# Patient Record
Sex: FEMALE | Race: WHITE | NOT HISPANIC OR LATINO | Employment: OTHER | URBAN - NONMETROPOLITAN AREA
[De-identification: names, ages, dates, MRNs, and addresses within clinical notes are randomized per-mention and may not be internally consistent; named-entity substitution may affect disease eponyms.]

---

## 2017-02-28 ENCOUNTER — PROCEDURE VISIT (OUTPATIENT)
Dept: OBSTETRICS AND GYNECOLOGY | Facility: CLINIC | Age: 72
End: 2017-02-28

## 2017-02-28 VITALS
HEIGHT: 69 IN | DIASTOLIC BLOOD PRESSURE: 65 MMHG | BODY MASS INDEX: 24.29 KG/M2 | SYSTOLIC BLOOD PRESSURE: 121 MMHG | HEART RATE: 83 BPM | WEIGHT: 164 LBS

## 2017-02-28 DIAGNOSIS — Z12.31 SCREENING MAMMOGRAM, ENCOUNTER FOR: ICD-10-CM

## 2017-02-28 DIAGNOSIS — Z12.39 ENCOUNTER FOR SCREENING BREAST EXAMINATION: Primary | ICD-10-CM

## 2017-02-28 DIAGNOSIS — Z90.710 H/O HYSTERECTOMY FOR BENIGN DISEASE: ICD-10-CM

## 2017-02-28 DIAGNOSIS — N95.1 MENOPAUSAL STATE: ICD-10-CM

## 2017-02-28 PROCEDURE — 99213 OFFICE O/P EST LOW 20 MIN: CPT | Performed by: NURSE PRACTITIONER

## 2017-02-28 NOTE — PROGRESS NOTES
"Subjective   Chief Complaint   Patient presents with   • Gynecologic Exam     annual exam     Mary Tan is a 71 y.o. year old  presenting to be seen for annual breast exam and mammogram.    · Last mammogram: was done on approximately 2015 and the result was: Birads IV (Suspicous abnormality).  Had biopsy for right breast lump with marker placement; bx was benign. Had normal diagnostic mammogram of right breast in 2016.  · Last breast MRI: she has never had a MRI    Last DEXA- 10/20/14 Normal  Last colonoscopy- 2015 Normal    No Additional Complaints Reported     She stopped taking the HRT b/c her daughter was diagnosed with estrogen dependent breast cancer last year.    The following portions of the patient's history were reviewed and updated as appropriate:problem list, current medications, allergies, past family history, past medical history, past social history and past surgical history    Review of Systems   Constitutional: Negative for activity change, appetite change, fatigue and unexpected weight change.   Respiratory: Negative for apnea, chest tightness and shortness of breath.    Cardiovascular: Negative for chest pain, palpitations and leg swelling.   Gastrointestinal: Negative for abdominal distention, abdominal pain, anal bleeding and blood in stool.   Genitourinary: Negative for difficulty urinating, dysuria, enuresis, genital sores, pelvic pain, vaginal bleeding, vaginal discharge and vaginal pain.   Skin: Negative for color change and rash.   Neurological: Negative for dizziness, weakness, light-headedness and headaches.   Hematological: Negative for adenopathy.   Psychiatric/Behavioral: Negative for confusion, decreased concentration, dysphoric mood and sleep disturbance. The patient is not nervous/anxious.          Objective   Visit Vitals   • /65   • Pulse 83   • Ht 69\" (175.3 cm)   • Wt 164 lb (74.4 kg)   • LMP  (Approximate)  Comment: 1983   • Breastfeeding " No   • BMI 24.22 kg/m2       General:  well developed; well nourished  no acute distress  appears stated age   Breasts:  Examined in supine position  Symmetric without masses or skin dimpling  Nipples normal without inversion, lesions or discharge  There are no palpable axillary nodes  Fibrocystic changes are present both breasts without a discrete mass     Lab Review   No data reviewed    Imaging   DEXA  Mammogram report       Assessment   1. Encounter for screening breast exam  2. Encounter for screening mammogram  3. History of hysterectomy for benign reasons  4. Menopausal state     Plan   1. Mammogram today  2. DEXA needed in 2019    No orders of the defined types were placed in this encounter.         This note was electronically signed.    Roxana Flynn, APRN  February 28, 2017

## 2017-05-30 ENCOUNTER — OFFICE VISIT (OUTPATIENT)
Dept: ORTHOPEDIC SURGERY | Facility: CLINIC | Age: 72
End: 2017-05-30

## 2017-05-30 VITALS — WEIGHT: 158.8 LBS | HEIGHT: 67 IN | BODY MASS INDEX: 24.92 KG/M2

## 2017-05-30 DIAGNOSIS — M25.562 CHRONIC PAIN OF LEFT KNEE: Primary | ICD-10-CM

## 2017-05-30 DIAGNOSIS — M25.462 EFFUSION OF LEFT KNEE: ICD-10-CM

## 2017-05-30 DIAGNOSIS — G89.29 CHRONIC PAIN OF LEFT KNEE: Primary | ICD-10-CM

## 2017-05-30 DIAGNOSIS — M23.92 INTERNAL DERANGEMENT OF LEFT KNEE: ICD-10-CM

## 2017-05-30 PROCEDURE — 20610 DRAIN/INJ JOINT/BURSA W/O US: CPT | Performed by: ORTHOPAEDIC SURGERY

## 2017-05-30 PROCEDURE — 99203 OFFICE O/P NEW LOW 30 MIN: CPT | Performed by: ORTHOPAEDIC SURGERY

## 2017-05-30 RX ORDER — GRAPE SEED EXT/BIOFLAV,CITRUS 50MG-250MG
CAPSULE ORAL
COMMUNITY
End: 2020-10-16

## 2017-05-30 RX ADMIN — LIDOCAINE HYDROCHLORIDE 2 ML: 10 INJECTION, SOLUTION INFILTRATION; PERINEURAL at 09:41

## 2017-05-30 RX ADMIN — TRIAMCINOLONE ACETONIDE 40 MG: 40 INJECTION, SUSPENSION INTRA-ARTICULAR; INTRAMUSCULAR at 09:41

## 2017-05-31 RX ORDER — TRIAMCINOLONE ACETONIDE 40 MG/ML
40 INJECTION, SUSPENSION INTRA-ARTICULAR; INTRAMUSCULAR
Status: COMPLETED | OUTPATIENT
Start: 2017-05-30 | End: 2017-05-30

## 2017-05-31 RX ORDER — LIDOCAINE HYDROCHLORIDE 10 MG/ML
2 INJECTION, SOLUTION INFILTRATION; PERINEURAL
Status: COMPLETED | OUTPATIENT
Start: 2017-05-30 | End: 2017-05-30

## 2017-09-21 ENCOUNTER — TRANSCRIBE ORDERS (OUTPATIENT)
Dept: LAB | Facility: HOSPITAL | Age: 72
End: 2017-09-21

## 2017-09-21 ENCOUNTER — LAB (OUTPATIENT)
Dept: LAB | Facility: HOSPITAL | Age: 72
End: 2017-09-21

## 2017-09-21 DIAGNOSIS — I10 ESSENTIAL HYPERTENSION: Primary | ICD-10-CM

## 2017-09-21 DIAGNOSIS — I10 ESSENTIAL HYPERTENSION: ICD-10-CM

## 2017-09-21 LAB
ALBUMIN SERPL-MCNC: 4.2 G/DL (ref 3.4–4.8)
ALBUMIN/GLOB SERPL: 1.4 G/DL (ref 1.1–1.8)
ALP SERPL-CCNC: 75 U/L (ref 38–126)
ALT SERPL W P-5'-P-CCNC: 34 U/L (ref 9–52)
ANION GAP SERPL CALCULATED.3IONS-SCNC: 8 MMOL/L (ref 5–15)
AST SERPL-CCNC: 35 U/L (ref 14–36)
BASOPHILS # BLD AUTO: 0.05 10*3/MM3 (ref 0–0.2)
BASOPHILS NFR BLD AUTO: 0.9 % (ref 0–2)
BILIRUB SERPL-MCNC: 0.7 MG/DL (ref 0.2–1.3)
BUN BLD-MCNC: 19 MG/DL (ref 7–21)
BUN/CREAT SERPL: 19.2 (ref 7–25)
CALCIUM SPEC-SCNC: 9.3 MG/DL (ref 8.4–10.2)
CHLORIDE SERPL-SCNC: 103 MMOL/L (ref 95–110)
CO2 SERPL-SCNC: 29 MMOL/L (ref 22–31)
CREAT BLD-MCNC: 0.99 MG/DL (ref 0.5–1)
DEPRECATED RDW RBC AUTO: 45.4 FL (ref 36.4–46.3)
EOSINOPHIL # BLD AUTO: 0.14 10*3/MM3 (ref 0–0.7)
EOSINOPHIL NFR BLD AUTO: 2.5 % (ref 0–7)
ERYTHROCYTE [DISTWIDTH] IN BLOOD BY AUTOMATED COUNT: 13.2 % (ref 11.5–14.5)
GFR SERPL CREATININE-BSD FRML MDRD: 55 ML/MIN/1.73 (ref 39–90)
GLOBULIN UR ELPH-MCNC: 3.1 GM/DL (ref 2.3–3.5)
GLUCOSE BLD-MCNC: 90 MG/DL (ref 60–100)
HCT VFR BLD AUTO: 40.2 % (ref 35–45)
HGB BLD-MCNC: 13.2 G/DL (ref 12–15.5)
IMM GRANULOCYTES # BLD: 0 10*3/MM3 (ref 0–0.02)
IMM GRANULOCYTES NFR BLD: 0 % (ref 0–0.5)
LYMPHOCYTES # BLD AUTO: 1.71 10*3/MM3 (ref 0.6–4.2)
LYMPHOCYTES NFR BLD AUTO: 30.1 % (ref 10–50)
MCH RBC QN AUTO: 30.6 PG (ref 26.5–34)
MCHC RBC AUTO-ENTMCNC: 32.8 G/DL (ref 31.4–36)
MCV RBC AUTO: 93.3 FL (ref 80–98)
MONOCYTES # BLD AUTO: 0.53 10*3/MM3 (ref 0–0.9)
MONOCYTES NFR BLD AUTO: 9.3 % (ref 0–12)
NEUTROPHILS # BLD AUTO: 3.25 10*3/MM3 (ref 2–8.6)
NEUTROPHILS NFR BLD AUTO: 57.2 % (ref 37–80)
PLATELET # BLD AUTO: 257 10*3/MM3 (ref 150–450)
PMV BLD AUTO: 11.3 FL (ref 8–12)
POTASSIUM BLD-SCNC: 5.5 MMOL/L (ref 3.5–5.1)
PROT SERPL-MCNC: 7.3 G/DL (ref 6.3–8.6)
RBC # BLD AUTO: 4.31 10*6/MM3 (ref 3.77–5.16)
SODIUM BLD-SCNC: 140 MMOL/L (ref 137–145)
WBC NRBC COR # BLD: 5.68 10*3/MM3 (ref 3.2–9.8)

## 2017-09-21 PROCEDURE — 80053 COMPREHEN METABOLIC PANEL: CPT | Performed by: INTERNAL MEDICINE

## 2017-09-21 PROCEDURE — 36415 COLL VENOUS BLD VENIPUNCTURE: CPT

## 2017-09-21 PROCEDURE — 85025 COMPLETE CBC W/AUTO DIFF WBC: CPT | Performed by: INTERNAL MEDICINE

## 2018-03-15 ENCOUNTER — APPOINTMENT (OUTPATIENT)
Dept: LAB | Facility: HOSPITAL | Age: 73
End: 2018-03-15

## 2018-03-15 ENCOUNTER — TRANSCRIBE ORDERS (OUTPATIENT)
Dept: LAB | Facility: HOSPITAL | Age: 73
End: 2018-03-15

## 2018-03-15 DIAGNOSIS — I10 ESSENTIAL (PRIMARY) HYPERTENSION: Primary | ICD-10-CM

## 2018-03-15 DIAGNOSIS — R53.83 FATIGUE, UNSPECIFIED TYPE: ICD-10-CM

## 2018-03-15 LAB
ALBUMIN SERPL-MCNC: 4.2 G/DL (ref 3.4–4.8)
ALBUMIN/GLOB SERPL: 1.4 G/DL (ref 1.1–1.8)
ALP SERPL-CCNC: 78 U/L (ref 38–126)
ALT SERPL W P-5'-P-CCNC: 30 U/L (ref 9–52)
ANION GAP SERPL CALCULATED.3IONS-SCNC: 13 MMOL/L (ref 5–15)
AST SERPL-CCNC: 33 U/L (ref 14–36)
BASOPHILS # BLD AUTO: 0.04 10*3/MM3 (ref 0–0.2)
BASOPHILS NFR BLD AUTO: 0.9 % (ref 0–2)
BILIRUB SERPL-MCNC: 0.3 MG/DL (ref 0.2–1.3)
BUN BLD-MCNC: 17 MG/DL (ref 7–21)
BUN/CREAT SERPL: 17.2 (ref 7–25)
CALCIUM SPEC-SCNC: 9.3 MG/DL (ref 8.4–10.2)
CHLORIDE SERPL-SCNC: 101 MMOL/L (ref 95–110)
CO2 SERPL-SCNC: 29 MMOL/L (ref 22–31)
CREAT BLD-MCNC: 0.99 MG/DL (ref 0.5–1)
DEPRECATED RDW RBC AUTO: 47.4 FL (ref 36.4–46.3)
EOSINOPHIL # BLD AUTO: 0.11 10*3/MM3 (ref 0–0.7)
EOSINOPHIL NFR BLD AUTO: 2.4 % (ref 0–7)
ERYTHROCYTE [DISTWIDTH] IN BLOOD BY AUTOMATED COUNT: 13.8 % (ref 11.5–14.5)
GFR SERPL CREATININE-BSD FRML MDRD: 55 ML/MIN/1.73 (ref 39–90)
GLOBULIN UR ELPH-MCNC: 3 GM/DL (ref 2.3–3.5)
GLUCOSE BLD-MCNC: 96 MG/DL (ref 60–100)
HCT VFR BLD AUTO: 40.7 % (ref 35–45)
HGB BLD-MCNC: 13.7 G/DL (ref 12–15.5)
IMM GRANULOCYTES # BLD: 0.01 10*3/MM3 (ref 0–0.02)
IMM GRANULOCYTES NFR BLD: 0.2 % (ref 0–0.5)
LYMPHOCYTES # BLD AUTO: 1.67 10*3/MM3 (ref 0.6–4.2)
LYMPHOCYTES NFR BLD AUTO: 35.7 % (ref 10–50)
MCH RBC QN AUTO: 31.4 PG (ref 26.5–34)
MCHC RBC AUTO-ENTMCNC: 33.7 G/DL (ref 31.4–36)
MCV RBC AUTO: 93.1 FL (ref 80–98)
MONOCYTES # BLD AUTO: 0.32 10*3/MM3 (ref 0–0.9)
MONOCYTES NFR BLD AUTO: 6.8 % (ref 0–12)
NEUTROPHILS # BLD AUTO: 2.53 10*3/MM3 (ref 2–8.6)
NEUTROPHILS NFR BLD AUTO: 54 % (ref 37–80)
PLATELET # BLD AUTO: 225 10*3/MM3 (ref 150–450)
PMV BLD AUTO: 11.3 FL (ref 8–12)
POTASSIUM BLD-SCNC: 5 MMOL/L (ref 3.5–5.1)
PROT SERPL-MCNC: 7.2 G/DL (ref 6.3–8.6)
RBC # BLD AUTO: 4.37 10*6/MM3 (ref 3.77–5.16)
SODIUM BLD-SCNC: 143 MMOL/L (ref 137–145)
TSH SERPL DL<=0.05 MIU/L-ACNC: <0.02 MIU/ML (ref 0.46–4.68)
WBC NRBC COR # BLD: 4.68 10*3/MM3 (ref 3.2–9.8)

## 2018-03-15 PROCEDURE — 36415 COLL VENOUS BLD VENIPUNCTURE: CPT | Performed by: INTERNAL MEDICINE

## 2018-03-15 PROCEDURE — 80053 COMPREHEN METABOLIC PANEL: CPT | Performed by: INTERNAL MEDICINE

## 2018-03-15 PROCEDURE — 85025 COMPLETE CBC W/AUTO DIFF WBC: CPT | Performed by: INTERNAL MEDICINE

## 2018-03-15 PROCEDURE — 84443 ASSAY THYROID STIM HORMONE: CPT | Performed by: INTERNAL MEDICINE

## 2018-03-16 ENCOUNTER — PREP FOR SURGERY (OUTPATIENT)
Dept: OTHER | Facility: HOSPITAL | Age: 73
End: 2018-03-16

## 2018-03-16 DIAGNOSIS — R07.9 CHEST PAIN, UNSPECIFIED TYPE: Primary | ICD-10-CM

## 2018-03-16 RX ORDER — SODIUM CHLORIDE 0.9 % (FLUSH) 0.9 %
1-10 SYRINGE (ML) INJECTION AS NEEDED
Status: CANCELLED | OUTPATIENT
Start: 2018-03-19

## 2018-03-19 ENCOUNTER — HOSPITAL ENCOUNTER (OUTPATIENT)
Facility: HOSPITAL | Age: 73
Setting detail: HOSPITAL OUTPATIENT SURGERY
Discharge: HOME | End: 2018-03-19
Attending: INTERNAL MEDICINE | Admitting: INTERNAL MEDICINE

## 2018-03-19 VITALS
DIASTOLIC BLOOD PRESSURE: 60 MMHG | TEMPERATURE: 97.2 F | RESPIRATION RATE: 18 BRPM | SYSTOLIC BLOOD PRESSURE: 132 MMHG | WEIGHT: 164.68 LBS | HEIGHT: 68 IN | BODY MASS INDEX: 24.96 KG/M2 | OXYGEN SATURATION: 99 % | HEART RATE: 66 BPM

## 2018-03-19 DIAGNOSIS — R07.9 CHEST PAIN, UNSPECIFIED TYPE: ICD-10-CM

## 2018-03-19 LAB
ANION GAP SERPL CALCULATED.3IONS-SCNC: 14 MMOL/L (ref 5–15)
APTT PPP: 22.3 SECONDS (ref 20–40.3)
BASOPHILS # BLD AUTO: 0 10*3/MM3 (ref 0–0.2)
BASOPHILS NFR BLD AUTO: 0 % (ref 0–2)
BUN BLD-MCNC: 26 MG/DL (ref 7–21)
BUN/CREAT SERPL: 35.6 (ref 7–25)
CALCIUM SPEC-SCNC: 8.9 MG/DL (ref 8.4–10.2)
CHLORIDE SERPL-SCNC: 104 MMOL/L (ref 95–110)
CO2 SERPL-SCNC: 24 MMOL/L (ref 22–31)
CREAT BLD-MCNC: 0.73 MG/DL (ref 0.5–1)
DEPRECATED RDW RBC AUTO: 47.2 FL (ref 36.4–46.3)
EOSINOPHIL # BLD AUTO: 0.01 10*3/MM3 (ref 0–0.7)
EOSINOPHIL NFR BLD AUTO: 0.1 % (ref 0–7)
ERYTHROCYTE [DISTWIDTH] IN BLOOD BY AUTOMATED COUNT: 14.1 % (ref 11.5–14.5)
GFR SERPL CREATININE-BSD FRML MDRD: 78 ML/MIN/1.73 (ref 39–90)
GLUCOSE BLD-MCNC: 114 MG/DL (ref 60–100)
HCT VFR BLD AUTO: 37.6 % (ref 35–45)
HGB BLD-MCNC: 12.8 G/DL (ref 12–15.5)
IMM GRANULOCYTES # BLD: 0.02 10*3/MM3 (ref 0–0.02)
IMM GRANULOCYTES NFR BLD: 0.3 % (ref 0–0.5)
INR PPP: 0.97 (ref 0.8–1.2)
INR PPP: 1.07 (ref 0.8–1.2)
LYMPHOCYTES # BLD AUTO: 0.73 10*3/MM3 (ref 0.6–4.2)
LYMPHOCYTES NFR BLD AUTO: 10 % (ref 10–50)
MCH RBC QN AUTO: 31.3 PG (ref 26.5–34)
MCHC RBC AUTO-ENTMCNC: 34 G/DL (ref 31.4–36)
MCV RBC AUTO: 91.9 FL (ref 80–98)
MONOCYTES # BLD AUTO: 0.35 10*3/MM3 (ref 0–0.9)
MONOCYTES NFR BLD AUTO: 4.8 % (ref 0–12)
NEUTROPHILS # BLD AUTO: 6.19 10*3/MM3 (ref 2–8.6)
NEUTROPHILS NFR BLD AUTO: 84.8 % (ref 37–80)
PLATELET # BLD AUTO: 201 10*3/MM3 (ref 150–450)
PMV BLD AUTO: 11.5 FL (ref 8–12)
POTASSIUM BLD-SCNC: 4.4 MMOL/L (ref 3.5–5.1)
PROTHROMBIN TIME: 12.7 SECONDS (ref 11.1–15.3)
PROTHROMBIN TIME: 13.7 SECONDS (ref 11.1–15.3)
RBC # BLD AUTO: 4.09 10*6/MM3 (ref 3.77–5.16)
SODIUM BLD-SCNC: 142 MMOL/L (ref 137–145)
T4 FREE SERPL-MCNC: 0.99 NG/DL (ref 0.78–2.19)
WBC NRBC COR # BLD: 7.3 10*3/MM3 (ref 3.2–9.8)

## 2018-03-19 PROCEDURE — 93458 L HRT ARTERY/VENTRICLE ANGIO: CPT | Performed by: INTERNAL MEDICINE

## 2018-03-19 PROCEDURE — C1760 CLOSURE DEV, VASC: HCPCS | Performed by: INTERNAL MEDICINE

## 2018-03-19 PROCEDURE — 80048 BASIC METABOLIC PNL TOTAL CA: CPT | Performed by: INTERNAL MEDICINE

## 2018-03-19 PROCEDURE — 25010000002 HYDROMORPHONE PER 4 MG: Performed by: INTERNAL MEDICINE

## 2018-03-19 PROCEDURE — 85610 PROTHROMBIN TIME: CPT | Performed by: INTERNAL MEDICINE

## 2018-03-19 PROCEDURE — 86376 MICROSOMAL ANTIBODY EACH: CPT | Performed by: INTERNAL MEDICINE

## 2018-03-19 PROCEDURE — 84439 ASSAY OF FREE THYROXINE: CPT | Performed by: INTERNAL MEDICINE

## 2018-03-19 PROCEDURE — 0 IOPAMIDOL PER 1 ML: Performed by: INTERNAL MEDICINE

## 2018-03-19 PROCEDURE — 84445 ASSAY OF TSI GLOBULIN: CPT | Performed by: INTERNAL MEDICINE

## 2018-03-19 PROCEDURE — 99152 MOD SED SAME PHYS/QHP 5/>YRS: CPT | Performed by: INTERNAL MEDICINE

## 2018-03-19 PROCEDURE — 86800 THYROGLOBULIN ANTIBODY: CPT | Performed by: INTERNAL MEDICINE

## 2018-03-19 PROCEDURE — C1769 GUIDE WIRE: HCPCS | Performed by: INTERNAL MEDICINE

## 2018-03-19 PROCEDURE — 25010000002 HYDROCORTISONE SODIUM SUCCINATE 100 MG RECONSTITUTED SOLUTION: Performed by: INTERNAL MEDICINE

## 2018-03-19 PROCEDURE — 84443 ASSAY THYROID STIM HORMONE: CPT | Performed by: INTERNAL MEDICINE

## 2018-03-19 PROCEDURE — 85025 COMPLETE CBC W/AUTO DIFF WBC: CPT | Performed by: INTERNAL MEDICINE

## 2018-03-19 PROCEDURE — 84479 ASSAY OF THYROID (T3 OR T4): CPT | Performed by: INTERNAL MEDICINE

## 2018-03-19 PROCEDURE — 25010000002 MIDAZOLAM PER 1 MG: Performed by: INTERNAL MEDICINE

## 2018-03-19 PROCEDURE — 84480 ASSAY TRIIODOTHYRONINE (T3): CPT | Performed by: INTERNAL MEDICINE

## 2018-03-19 PROCEDURE — C1894 INTRO/SHEATH, NON-LASER: HCPCS | Performed by: INTERNAL MEDICINE

## 2018-03-19 PROCEDURE — 85730 THROMBOPLASTIN TIME PARTIAL: CPT | Performed by: INTERNAL MEDICINE

## 2018-03-19 PROCEDURE — 84481 FREE ASSAY (FT-3): CPT | Performed by: INTERNAL MEDICINE

## 2018-03-19 PROCEDURE — 99153 MOD SED SAME PHYS/QHP EA: CPT | Performed by: INTERNAL MEDICINE

## 2018-03-19 RX ORDER — METOPROLOL SUCCINATE 25 MG
12.5 TABLET, EXTENDED RELEASE 24 HR ORAL NIGHTLY
COMMUNITY
End: 2020-10-16

## 2018-03-19 RX ORDER — SODIUM CHLORIDE 450 MG/100ML
100 INJECTION, SOLUTION INTRAVENOUS CONTINUOUS
Status: DISCONTINUED | OUTPATIENT
Start: 2018-03-19 | End: 2018-03-19 | Stop reason: HOSPADM

## 2018-03-19 RX ORDER — PREDNISONE 20 MG/1
20 TABLET ORAL DAILY
COMMUNITY
End: 2018-04-04

## 2018-03-19 RX ORDER — LIDOCAINE HYDROCHLORIDE 20 MG/ML
INJECTION, SOLUTION INFILTRATION; PERINEURAL AS NEEDED
Status: DISCONTINUED | OUTPATIENT
Start: 2018-03-19 | End: 2018-03-19 | Stop reason: HOSPADM

## 2018-03-19 RX ORDER — SODIUM CHLORIDE 9 MG/ML
50 INJECTION, SOLUTION INTRAVENOUS CONTINUOUS
Status: DISCONTINUED | OUTPATIENT
Start: 2018-03-19 | End: 2018-03-19 | Stop reason: HOSPADM

## 2018-03-19 RX ORDER — SODIUM CHLORIDE 0.9 % (FLUSH) 0.9 %
1-10 SYRINGE (ML) INJECTION AS NEEDED
Status: DISCONTINUED | OUTPATIENT
Start: 2018-03-19 | End: 2018-03-19 | Stop reason: HOSPADM

## 2018-03-19 RX ORDER — NITROGLYCERIN 0.4 MG/1
TABLET SUBLINGUAL AS NEEDED
Status: DISCONTINUED | OUTPATIENT
Start: 2018-03-19 | End: 2018-03-19 | Stop reason: HOSPADM

## 2018-03-19 RX ORDER — DIPHENHYDRAMINE HCL 25 MG
25 CAPSULE ORAL EVERY 6 HOURS PRN
COMMUNITY
End: 2018-04-04

## 2018-03-19 RX ORDER — MIDAZOLAM HYDROCHLORIDE 1 MG/ML
INJECTION INTRAMUSCULAR; INTRAVENOUS AS NEEDED
Status: DISCONTINUED | OUTPATIENT
Start: 2018-03-19 | End: 2018-03-19 | Stop reason: HOSPADM

## 2018-03-19 RX ORDER — BIOTIN 10 MG
2 TABLET ORAL DAILY
COMMUNITY

## 2018-03-19 RX ADMIN — HYDROCORTISONE SODIUM SUCCINATE 100 MG: 100 INJECTION, POWDER, FOR SOLUTION INTRAMUSCULAR; INTRAVENOUS at 08:46

## 2018-03-19 RX ADMIN — SODIUM CHLORIDE 50 ML/HR: 9 INJECTION, SOLUTION INTRAVENOUS at 07:04

## 2018-03-19 NOTE — H&P
Cardiology History and Physical Note        Patient Name: Mary Tan  Age/Sex: 72 y.o. female  : 1945  MRN: [id number]    Date of Admission : 3/19/2018    Primary care Physician: No Known Provider    Reason for Admission:  Chest pain frequent premature ventricular complex unstable angina pectoris left heart catheterization      Subjective:       Chief Complaint: Chest pain palpitation.        History of Present Illness:  Mary Tan is a 72 y.o. female     Body mass index is 25.04 kg/m². with a past medical history significant for chest pain with a negative adenosine Cardiolite stress test for any evidence of stress-induced ischemia, negative head up tilt table testing, with frequent premature ventricular complex, with history of osteoporosis, preserved left ventricular systolic function with mild to moderate tricuspid regurgitation with a right ventricular systolic pressure of 34, mild mitral regurgitation and history of bladder carcinoma, status post surgical intervention and previous history of tobacco abuse.   Patient presented for further evaluation for symptoms of chest pain.  Patient has had on and off symptoms of chest discomfort which is nonpleuritic in nature and nonradiating without any associated shortness of breath.  Patient has had frequent premature ventricular complex with episodes of trigeminy.  Patient has been on magnesium supplement along with potassium supplement.    Patient previous transthoracic echocardiogram had revealed preserved left ventricular systolic function.    On further questioning patient had indicated off a sister who has frequent premature ventricular complex in [place] and had undergone radiofrequency ablation.  Patient has had previous history of lightheaded dizziness but currently is not having any symptoms of dizziness or syncopal episode to suggest sustained ventricular tachycardia.    Patient on further questioning denies any symptoms of  paroxysmal nocturnal dyspnea or orthopnea.  Patient 10 point review of system except for what is stated in the history of present illness is negative.        Past Medical History:  1. Light-headed, dizziness, presyncope with sinus bradycardia with negative head up tilt table testing.   2. Negative adenosine Cardiolite stress test done in 2010 for any evidence of stress-induced ischemia   3. Concentric left ventricular hypertrophy with an ejection fraction of 55%   4. Mild to moderate tricuspid regurgitation with a right ventricular systolic pressure of 34 5. Mild mitral regurgitation   6. Osteoporosis   7. History of bladder carcinoma   8. Previous history of tobacco abuse.  9.  Fibrocystic breast disease.     PSH:   1. Hysterectomy   2. Tonsillectomy   3. Cataract surgery   4. Left eye lens implant   5. Coloscopy   6. Bladder surgery.  7.  Breast biopsy.  8.  Pap smear.      SH: Quit smoking in 2001. Social alcohol intake. Patient is active, currently retired.       FH: Father had atrial fibrillation.     CARDIAC RISK FACTORS:   1. Postmenopausal   2. Previous history of tobacco abuse             Allergies:  Allergies   Allergen Reactions   • Contrast Dye Anaphylaxis     IODINATED CONTRAST MEDIA - IV DYE   • Macrobid [Nitrofurantoin Monohyd Macro] Shortness Of Breath   • Macrodantin [Nitrofurantoin Macrocrystal] Shortness Of Breath   • Nitrofuran Derivatives Shortness Of Breath   • Adhesive Tape Rash   • Biaxin [Clarithromycin] Rash   • Ceclor [Cefaclor] Rash   • Ceftin [Cefuroxime Axetil] Rash   • Codeine Rash   • E.E.S. [Erythromycin] Rash   • Other Rash     Cipro   • Penicillins Rash   • Septra [Sulfamethoxazole-Trimethoprim] Rash   • Sulfa Antibiotics Rash       Home Medication::  Prior to Admission medications    Medication Sig Start Date End Date Taking? Authorizing Provider   ASPIRIN PO Every day Oral 8/18/16  Yes Historical Provider, MD   Biotin 10 MG tablet Take 2 capsules by mouth Daily.   Yes Historical  Provider, MD   Black Cohosh 540 MG capsule Take  by mouth.   Yes Historical Provider, MD   diphenhydrAMINE (BENADRYL) 25 mg capsule Take 25 mg by mouth Every 6 (Six) Hours As Needed for Itching. 2 caps   Yes Historical Provider, MD   Magnesium Oxide (MAG-OXIDE PO) Take  by mouth. 2 times per day Oral 8/18/16  Yes Historical Provider, MD   metoprolol succinate XL (TOPROL-XL) 12.5 MG 24 hr half tablet Take 12.5 mg by mouth Every Night.   Yes Historical Provider, MD   predniSONE (DELTASONE) 20 MG tablet Take 20 mg by mouth Daily. 3 tabs in am 3 tabs at night   Yes Historical Provider, MD           Review of Systems:       Constitutional:  Denies recent weight loss, weight gain, fever or chills, no change in exercise tolerance     HENT:  Denies any hearing loss, epistaxis, hoarseness, or difficulty speaking.     Eyes: Wears eyeglasses or contact lenses     Respiratory:  Denies dyspnea with exertion,no cough, wheezing, or hemoptysis.     Cardiovascular: Positive for chest pain and  Palpitations,. Negative for orthopnea, PND, peripheral edema, syncope, or claudication.     Gastrointestinal:  Denies change in bowel habits, dyspepsia, ulcer disease, hematochezia, or melena.  No nausea, no vomiting, no hematemesis, no diarrhea or constipation, no melena      Endocrine: Negative for cold intolerance, heat intolerance, polydipsia, polyphagia and polyuria. Denies any history of weight change, heat/cold intolerance, polydipsia, polyuria     Genitourinary: Negative for hematuria.      Musculoskeletal: Denies any history of arthritic symptoms or back problems .  No joint pain, joint stiffness, joint swelling, muscle pain, muscle weakness and neck pain    Skin:  Denies any change in hair or nails, rashes, or skin lesions.     Allergic/Immunologic: Negative.  Negative for environmental allergies, food allergies and immunocompromised state.     Neurological:  Denies any history of recurrent headaches, strokes, TIA, or seizure  disorder.     Hematological: Denies excessive bleeding, easy bruising, fatigue, lymphadenopathy and petechiae or any bleeding disorders, or lymphadenopathy.     Psychiatric/Behavioral: Denies any history of depression, substance abuse, or change in cognitive function. Denies any psychomotor reaction or tangential thought.  No depression, homicidal ideations and suicidal ideations    Endocrine: No frequent urination and nocturia, temperature intolerance, weight gain, unintended and weight loss, unintended            Objective:     Objective:  Temp:  [97 °F (36.1 °C)] 97 °F (36.1 °C)  Heart Rate:  [69] 69  Resp:  [18] 18  BP: (155)/(77) 155/77      Body mass index is 25.04 kg/m².           Physical Exam:   General Appearance:    Alert, oriented, cooperative, in no acute distress   Head:    Normocephalic, atraumatic, without obvious abnormality   Eyes:           JAYLENE  Lids and lashes normal, conjunctivae and sclerae normal, no icterus, no pallor   Ears:    Ears appear intact with no abnormalities noted   Throat:   Mucous membranes pink and moist   Neck:   Supple, trachea midline, no carotid bruit, no organomegaly or JVD   Lungs:     Clear to auscultation and percussion, respirations regular, even and Unlabored. No wheezes, rales, rhonchi    Heart:    Regular rhythm and normal rate, normal S1 and S2, no            murmur, no gallop, no rub, no click   Abdomen:     Soft, non-tender, non-distended, no guarding, no rebound tenderness, Normal bowel sounds in all four quadrant, no masses, liver and spleen nonpalpable,    Genitalia:    Deferred   Extremities:   Moves all extremities well, no edema, no cyanosis, no              Redness, no clubbing   Pulses:   Pulses palpable and equal bilaterally   Skin:   Moist and warm. No bleeding, bruising or rash   Neurologic/Psychiatric:   Alert and oriented to person, place, and time.  Motor, power and tone in upper and lower extremity is grossly intact.  No focal neurological  deficits. Normal cognitive function. No psychomotor reaction or tangential thought. No depression, homicidal ideations and suicidal ideations           Lab Review:       Results from last 7 days  Lab Units 03/19/18  0709 03/15/18  1121   SODIUM mmol/L 142 143   POTASSIUM mmol/L 4.4 5.0   CHLORIDE mmol/L 104 101   CO2 mmol/L 24.0 29.0   BUN mg/dL 26* 17   CREATININE mg/dL 0.73 0.99   CALCIUM mg/dL 8.9 9.3   BILIRUBIN mg/dL  --  0.3   ALK PHOS U/L  --  78   ALT (SGPT) U/L  --  30   AST (SGOT) U/L  --  33   GLUCOSE mg/dL 114* 96               Results from last 7 days  Lab Units 03/19/18  0709   WBC 10*3/mm3 7.30   HEMOGLOBIN g/dL 12.8   HEMATOCRIT % 37.6   PLATELETS 10*3/mm3 201       Results from last 7 days  Lab Units 03/19/18  0709   INR  0.97   APTT seconds 22.3               Results from last 7 days  Lab Units 03/15/18  1121   TSH mIU/mL <0.020*           EKG:   ECG/EMG Results (last 24 hours)     ** No results found for the last 24 hours. **          Imaging:  Imaging Results (last 24 hours)     ** No results found for the last 24 hours. **          I personally viewed and interpreted the patient's EKG/Telemetry data.    Assessment:   1.  Chest pain.  2.  Frequent premature ventricular complex.  3.  Previous stress tests which was negative.  4.  Negative head up tilt table testing  5.  Hyperthyroidism possible with a low TSH.  6.  Mild to moderate tricuspid regurgitation          Plan:   1.  Chest pain suggestive of crescendo unstable angina pectoris.  Patient has frequent premature ventricular complex.  Patient at the present time has been recommended to undergo a transthoracic echocardiogram to evaluate the left ventricular systolic function and regional segmental wall motion abnormality.  Patient has also been recommended to undergo coronary angiogram due to the patient ongoing symptoms of chest pain with increasing frequency and severity along with premature ventricular complex.  Risk-benefit treatment option  for the coronary angiogram were discussed with the patient and an informed consent was obtained from the patient for the coronary angiogram.  Patient Mallampati score #2 patient ASA classification was #2.  Risk for minimal sedation was also discussed with the patient.  2.  Frequent premature ventricular complex.  Patient would be supplemented with magnesium which she is already taking along with a low dose of a beta blocker Inderal 5 mg twice a day and her blood pressure is not elevated.  Patient does not have history of hypertension and blood pressure is on the low side at home 104/50 and would not be able to tolerate metoprolol.  Patient has been recommended to keep a close follow-up and the blood pressure.  3.  Mild-to-moderate tricuspid regurgitation mild mitral regurgitation.  Clinically at the present time patient is not in congestive heart failure.  4.  Frequent premature ventricular complex.  Patient would undergo complete thyroid profile check including TSH to rule out hyperthyroidism.  5.  Fibrocystic breast disease is noted.  6.  History of bladder carcinoma status post surgical intervention is noted.  7.  Previous history of tobacco abuse is noted.      Time: time spent in face-to-face evaluation of greater than 55 minutes and interacting and formulating examining and discussing the plan with the patient with 50% of greater time spent in face-to-face interaction.    Obed Leonard MD  03/19/18  8:32 AM      Dictated utilizing Dragon dictation.

## 2018-03-20 LAB
FT4I SERPL CALC-MCNC: 1.6 (ref 1.2–4.9)
FT4I SERPL CALC-MCNC: 1.6 (ref 1.2–4.9)
INTERPRETATION: NORMAL
T3 SERPL-MCNC: 71 NG/DL (ref 71–180)
T3FREE SERPL-MCNC: 1.9 PG/ML (ref 2–4.4)
T3FREE SERPL-MCNC: 2 PG/ML (ref 2–4.4)
T3RU NFR SERPL: 26 % (ref 24–39)
T3RU NFR SERPL: 27 % (ref 24–39)
T4 FREE SERPL-MCNC: 1.14 NG/DL (ref 0.82–1.77)
T4 SERPL-MCNC: 5.9 UG/DL (ref 4.5–12)
T4 SERPL-MCNC: 6 UG/DL (ref 4.5–12)
THYROGLOB AB SERPL-ACNC: <1 IU/ML (ref 0–0.9)
THYROPEROXIDASE AB SERPL-ACNC: 27 IU/ML (ref 0–34)
TSH SERPL-ACNC: 0.13 UIU/ML (ref 0.45–4.5)
TSH SERPL-ACNC: 0.13 UIU/ML (ref 0.45–4.5)

## 2018-03-21 LAB — TSI SER-MCNC: <0.1 IU/L (ref 0–0.55)

## 2018-04-04 ENCOUNTER — OFFICE VISIT (OUTPATIENT)
Dept: ENDOCRINOLOGY | Facility: CLINIC | Age: 73
End: 2018-04-04

## 2018-04-04 VITALS
WEIGHT: 164 LBS | SYSTOLIC BLOOD PRESSURE: 142 MMHG | HEIGHT: 68 IN | DIASTOLIC BLOOD PRESSURE: 64 MMHG | HEART RATE: 93 BPM | BODY MASS INDEX: 24.86 KG/M2

## 2018-04-04 DIAGNOSIS — R94.6 ABNORMAL THYROID FUNCTION TEST: Primary | ICD-10-CM

## 2018-04-04 PROCEDURE — 99204 OFFICE O/P NEW MOD 45 MIN: CPT | Performed by: NURSE PRACTITIONER

## 2018-04-04 NOTE — PROGRESS NOTES
Referring provider Obed Leonard       History     72 year old female presents for consultation    REASON - abnormal thyroid function studies       Duration  - since March 2018 but there is an abnormal TSH dated back To August 2016    Timing - constant    Quality - not controlled    Severity - mild          Context - routine lab work by cardiologist     Location/size - no ultrasound     Quantity -     Lab Results   Component Value Date    TSH 0.127 (L) 03/19/2018    TSH 0.130 (L) 03/19/2018     Component      Latest Ref Rng & Units 3/19/2018 3/19/2018           1:12 PM  1:12 PM   TSH Baseline      0.450 - 4.500 uIU/mL 0.130 (L) 0.127 (L)   T4, Total      4.5 - 12.0 ug/dL 5.9 6.0   T3 Uptake      24 - 39 % 27 26   Free Thyroxine Index      1.2 - 4.9 1.6 1.6   T3, Total      71 - 180 ng/dL 71    Thyroid Peroxidase Antibody      0 - 34 IU/mL 27    Thyroglobulin Ab      0.0 - 0.9 IU/mL <1.0    T3, Free      2.0 - 4.4 pg/mL 1.9 (L) 2.0   Interpretation        Comment   Free T4      0.78 - 2.19 ng/dL 0.99 1.14   Thyroid Stimulating Immunoglobulin      0.00 - 0.55 IU/L <0.10      Component      Latest Ref Rng & Units 8/18/2016   TSH Baseline      0.46 - 4.68 uIU/ml 0.06 (L)     Symptoms - heart racing, hot flashes, denies weight loss, anxiety, insomnia , diarrhea     Alleviating Factors - none    Aggravating Factors - biotin       Past Medical History:   Diagnosis Date   • Abnormal findings on diagnostic imaging of breast 01/15/2016    Benign Fibrosis   • Fibrocystic breast    • History of colon polyps 01/07/2015   • History of neoplasm of bladder 11/11/2015    Dx in 2013, cancer removed in remission   • Hormone replacement therapy 10/22/2015    (Postmenopausal)   • Osteoporosis 11/11/2015     Past Surgical History:   Procedure Laterality Date   • BREAST BIOPSY Right 11/18/2015    BX BREAST PERCUT W/DEVICE - Biopsy of right breast with placement of a breast localization clip performed ultrasonographically of the lesion  at the 7 o'clock position 1 cm from the nipple.   • BREAST CYST ASPIRATION      Remove Breast Lesion   • CARDIAC CATHETERIZATION N/A 3/19/2018    Procedure: Left Heart Cath Pleaswe schedule patinet for [facility] 3/19/2018;  Surgeon: Obed Leonard MD;  Location: [facility] INVASIVE LOCATION;  Service: Cardiovascular   • HYSTERECTOMY     • MAMMO DIAGNOSTIC UNILATERAL Right 2016    DIAG MAMM, UNILAT RT DIGITAL   • PAP SMEAR  2011    Negative   • VAGINAL DELIVERY      x 2     Family History   Problem Relation Age of Onset   • ALS Mother      Mother  at 74 of ALS   • Heart disease Father      Father  at age 86 of heart disease and kidney failure.   • Kidney disease Father      Father  at age 86 of heart disease and kidney failure   • Cancer Maternal Aunt      Breast Cancer   • Cancer Maternal Grandmother      Uterine and Breast Cancer   • Cancer Maternal Grandfather      Stomach Cancer   • Other Other      Epilepsy   • Breast cancer Daughter      Social History   Substance Use Topics   • Smoking status: Former Smoker   • Smokeless tobacco: Never Used   • Alcohol use Yes      Comment: Current some day - Drinks Occasionally           Current Outpatient Prescriptions:   •  ASPIRIN PO, Every day Oral, Disp: , Rfl:   •  Biotin 10 MG tablet, Take 2 capsules by mouth Daily., Disp: , Rfl:   •  Black Cohosh 540 MG capsule, Take  by mouth., Disp: , Rfl:   •  Magnesium Oxide (MAG-OXIDE PO), Take  by mouth. 2 times per day Oral, Disp: , Rfl:   •  metoprolol succinate XL (TOPROL-XL) 12.5 MG 24 hr half tablet, Take 12.5 mg by mouth Every Night., Disp: , Rfl:         Review of Systems   Constitutional: Negative for activity change, appetite change, diaphoresis and fatigue.   HENT: Negative for facial swelling, sneezing, sore throat, tinnitus, trouble swallowing and voice change.    Eyes: Negative for photophobia, pain, discharge, redness, itching and visual disturbance.   Respiratory: Negative for apnea, cough,  "choking, chest tightness and shortness of breath.    Cardiovascular: Negative for chest pain, palpitations and leg swelling.   Gastrointestinal: Negative for abdominal distention, abdominal pain, constipation, diarrhea, nausea and vomiting.   Endocrine: Negative for cold intolerance, heat intolerance, polydipsia, polyphagia and polyuria.   Genitourinary: Negative for difficulty urinating, dysuria, frequency, hematuria and urgency.   Musculoskeletal: Negative for arthralgias, back pain, gait problem, joint swelling, myalgias, neck pain and neck stiffness.   Skin: Negative for color change, pallor, rash and wound.   Neurological: Negative for dizziness, tremors, weakness, light-headedness, numbness and headaches.   Hematological: Negative for adenopathy. Does not bruise/bleed easily.   Psychiatric/Behavioral: Negative for behavioral problems, confusion and sleep disturbance.       /64 (BP Location: Left arm, Patient Position: Sitting, Cuff Size: Adult)   Pulse 93   Ht 172.7 cm (68\")   Wt 74.4 kg (164 lb)   LMP  (LMP Unknown)   BMI 24.94 kg/m²     Physical Exam   Constitutional: She is oriented to person, place, and time. She appears well-developed and well-nourished. No distress.   HENT:   Head: Normocephalic and atraumatic.   Right Ear: External ear normal.   Left Ear: External ear normal.   Nose: Nose normal.   Eyes: Conjunctivae and EOM are normal. Pupils are equal, round, and reactive to light.   Neck: Normal range of motion. Neck supple. No tracheal deviation present. No thyromegaly present.   Cardiovascular: Normal rate, regular rhythm and normal heart sounds.    No murmur heard.  Pulmonary/Chest: Effort normal and breath sounds normal. No respiratory distress. She has no wheezes.   Abdominal: Soft. Bowel sounds are normal. There is no tenderness. There is no rebound and no guarding.   Musculoskeletal: Normal range of motion. She exhibits no edema, tenderness or deformity.   Neurological: She is alert " and oriented to person, place, and time. No cranial nerve deficit.   Skin: Skin is warm and dry. No rash noted.   Psychiatric: She has a normal mood and affect. Her behavior is normal. Judgment and thought content normal.           Labs    Lab Results   Component Value Date    GLUCOSE 114 (H) 03/19/2018    BUN 26 (H) 03/19/2018    CREATININE 0.73 03/19/2018    EGFRIFNONA 78 03/19/2018    BCR 35.6 (H) 03/19/2018    K 4.4 03/19/2018    CO2 24.0 03/19/2018    CALCIUM 8.9 03/19/2018    ALBUMIN 4.20 03/15/2018    LABIL2 1.4 03/15/2018    AST 33 03/15/2018    ALT 30 03/15/2018       Lab Results   Component Value Date    WBC 7.30 03/19/2018    HGB 12.8 03/19/2018    HCT 37.6 03/19/2018    MCV 91.9 03/19/2018     03/19/2018       Lab Results   Component Value Date    ABZI13YZ 50.8 08/18/2016       No results found for: HGBA1C          Lab Results   Component Value Date    TSH 0.127 (L) 03/19/2018    TSH 0.130 (L) 03/19/2018       Lab Results   Component Value Date    FREET4 0.99 03/19/2018    FREET4 1.14 03/19/2018       Lab Results   Component Value Date    Z4SQETB 71 03/19/2018       Thyroid Peroxidase Antibodies    Lab Results   Component Value Date    THYROIDAB 27 03/19/2018       ThyroidStimulating Immunoglobulin    No results found for: LABTHYR      Assessment         ICD-10-CM ICD-9-CM   1. Abnormal thyroid function test R94.6 794.5     Abnormal thyroid function test    In summary Mrs. Tan is a very pleasant 72 year old female that was found to have an abnormal TSH after presenting for heart racing. She has been referred to us for further evaluation.    PLAN    Differential diagnsosis includes subclinical hyperthryoidism verus biotin interference    She takes high doses of biotin twice a day which has been show to interfere with the TSH assay    T3 and T4 are both normal , TSI and TPO were negative for Graves and Hashimoto's    We will stop the biotin and she will do repeat labs in 5 days     Will Check  TSH, Free T4,T3 and biotin level    If normal no treatment indicated this was a biotin intereference    If TSH is still abnormal will schedule for thyroid ultrasound and thyroid uptake and scan     Will start methimazole after the scan    She is agreeable to this plan    Lab slip given she will have done at her local lab and we will call with results    Will follow up based on lab results      Records from Dr. Leonard received and reviewed from 2018   Thank you for this consultation            Record from 2018 obtained and summarized  Labs reviewed from 2018 and 2016  Labs ordered today

## 2018-04-16 ENCOUNTER — TELEPHONE (OUTPATIENT)
Dept: FAMILY MEDICINE CLINIC | Facility: CLINIC | Age: 73
End: 2018-04-16

## 2018-04-17 ENCOUNTER — TELEPHONE (OUTPATIENT)
Dept: ENDOCRINOLOGY | Facility: CLINIC | Age: 73
End: 2018-04-17

## 2018-04-17 DIAGNOSIS — R94.6 ABNORMAL THYROID FUNCTION TEST: Primary | ICD-10-CM

## 2018-04-17 NOTE — TELEPHONE ENCOUNTER
----- Message from SERAFIN Leslie sent at 4/17/2018  8:29 AM CDT -----  Please let her know that the thyroid levels are normal; I would suggest repeat again in 4 weeks to be sure; stop the biotin at least 5 days prior to the lab work; I will put order in epic we can fax to her local lab  Abhinav

## 2018-10-22 DIAGNOSIS — Z12.39 SCREENING FOR MALIGNANT NEOPLASM OF BREAST: Primary | ICD-10-CM

## 2020-09-29 ENCOUNTER — OFFICE VISIT (OUTPATIENT)
Dept: GASTROENTEROLOGY | Facility: CLINIC | Age: 75
End: 2020-09-29

## 2020-09-29 VITALS
SYSTOLIC BLOOD PRESSURE: 150 MMHG | DIASTOLIC BLOOD PRESSURE: 90 MMHG | OXYGEN SATURATION: 96 % | BODY MASS INDEX: 24.08 KG/M2 | HEART RATE: 65 BPM | WEIGHT: 153.4 LBS | HEIGHT: 67 IN

## 2020-09-29 DIAGNOSIS — K63.5 POLYP OF COLON, UNSPECIFIED PART OF COLON, UNSPECIFIED TYPE: Primary | ICD-10-CM

## 2020-09-29 PROCEDURE — S0260 H&P FOR SURGERY: HCPCS | Performed by: INTERNAL MEDICINE

## 2020-09-29 RX ORDER — SPIRONOLACTONE 25 MG/1
25 TABLET ORAL EVERY OTHER DAY
COMMUNITY
End: 2021-07-27

## 2020-09-29 RX ORDER — DEXTROSE AND SODIUM CHLORIDE 5; .45 G/100ML; G/100ML
30 INJECTION, SOLUTION INTRAVENOUS CONTINUOUS PRN
Status: CANCELLED | OUTPATIENT
Start: 2020-10-21

## 2020-09-29 RX ORDER — SODIUM, POTASSIUM,MAG SULFATES 17.5-3.13G
1 SOLUTION, RECONSTITUTED, ORAL ORAL EVERY 12 HOURS
Qty: 1 BOTTLE | Refills: 0 | Status: SHIPPED | OUTPATIENT
Start: 2020-09-29 | End: 2020-09-29 | Stop reason: ALTCHOICE

## 2020-09-29 RX ORDER — SILDENAFIL 100 MG/1
20 TABLET, FILM COATED ORAL 3 TIMES DAILY
COMMUNITY
End: 2022-10-31

## 2020-09-29 RX ORDER — SOTALOL HYDROCHLORIDE 120 MG/1
120 TABLET ORAL DAILY
COMMUNITY
End: 2020-10-16

## 2020-09-29 NOTE — PROGRESS NOTES
[facility]      Chief Complaint:   Chief Complaint   Patient presents with   • Colon Cancer Screening       Subjective     HPI:   Patient for screening colonoscopy.  Patient states that she has been found to have colonic polyps in the past.  Patient denies any abdominal pain nausea vomiting or changes in bowel habits at this time.    Plan; we will schedule patient for screening colonoscopy    Past Medical History:   Past Medical History:   Diagnosis Date   • Abnormal findings on diagnostic imaging of breast 01/15/2016    Benign Fibrosis   • Fibrocystic breast    • History of colon polyps 2015   • History of neoplasm of bladder 2015    Dx in , cancer removed in remission   • Hormone replacement therapy 10/22/2015    (Postmenopausal)   • Osteoporosis 2015       Past Surgical History:  Past Surgical History:   Procedure Laterality Date   • BREAST BIOPSY Right 2015    BX BREAST PERCUT W/DEVICE - Biopsy of right breast with placement of a breast localization clip performed ultrasonographically of the lesion at the 7 o'clock position 1 cm from the nipple.   • BREAST CYST ASPIRATION      Remove Breast Lesion   • CARDIAC CATHETERIZATION N/A 3/19/2018    Procedure: Left Heart Cath Pleaswe schedule patinet for C 3/19/2018;  Surgeon: Obed Leonard MD;  Location: [facility] INVASIVE LOCATION;  Service: Cardiovascular   • HYSTERECTOMY     • MAMMO DIAGNOSTIC UNILATERAL Right 2016    DIAG MAMM, UNILAT RT DIGITAL   • OOPHORECTOMY     • PAP SMEAR  2011    Negative   • VAGINAL DELIVERY      x 2       Family History:  Family History   Problem Relation Age of Onset   • ALS Mother         Mother  at 74 of ALS   • Heart disease Father         Father  at age 86 of heart disease and kidney failure.   • Kidney disease Father         Father  at age 86 of heart disease and kidney failure   • Cancer Maternal Aunt         Breast Cancer   • Breast cancer Maternal  Aunt    • Cancer Maternal Grandmother         Uterine and Breast Cancer   • Breast cancer Maternal Grandmother    • Cancer Maternal Grandfather         Stomach Cancer   • Other Other         Epilepsy   • Breast cancer Daughter        Social History:   reports that she has quit smoking. She has never used smokeless tobacco. She reports current alcohol use.    Medications:   Prior to Admission medications    Medication Sig Start Date End Date Taking? Authorizing Provider   ASPIRIN PO Every day Oral 8/18/16  Yes Mina Mason MD   Biotin 10 MG tablet Take 2 capsules by mouth Daily.   Yes Mina Mason MD   Magnesium Oxide (MAG-OXIDE PO) Take  by mouth. 2 times per day Oral 8/18/16  Yes Mina Mason MD   sildenafil (VIAGRA) 100 MG tablet Take 20 mg by mouth 3 (Three) Times a Day.   Yes Mina Mason MD   sotalol (BETAPACE) 120 MG tablet Take 120 mg by mouth Daily.   Yes Mina Mason MD   spironolactone (ALDACTONE) 25 MG tablet Take 25 mg by mouth Every Other Day.   Yes Mina Mason MD   Black Cohosh 540 MG capsule Take  by mouth.    Mina Mason MD   metoprolol succinate XL (TOPROL-XL) 12.5 MG 24 hr half tablet Take 12.5 mg by mouth Every Night.    Mina Mason MD   sodium-potassium-magnesium sulfates (SUPREP) 17.5-3.13-1.6 GM/177ML solution oral solution Take 1 bottle by mouth Every 12 (Twelve) Hours. 9/29/20   Anshu Galloway MD       Allergies:  Contrast dye, Macrobid [nitrofurantoin monohyd macro], Macrodantin [nitrofurantoin macrocrystal], Nitrofuran derivatives, Adhesive tape, Biaxin [clarithromycin], Ceclor [cefaclor], Ceftin [cefuroxime axetil], Codeine, E.e.s. [erythromycin], Other, Penicillins, Septra [sulfamethoxazole-trimethoprim], and Sulfa antibiotics    ROS:    Review of Systems   Constitutional: Negative for activity change, appetite change, chills, diaphoresis, fatigue, fever and unexpected weight change.   HENT: Negative for sore  "throat and trouble swallowing.    Respiratory: Negative for shortness of breath.    Gastrointestinal: Negative for abdominal distention, abdominal pain, anal bleeding, blood in stool, constipation, diarrhea, nausea, rectal pain and vomiting.   Endocrine: Negative for polydipsia, polyphagia and polyuria.   Genitourinary: Negative for difficulty urinating.   Musculoskeletal: Negative for arthralgias.   Skin: Negative for pallor.   Allergic/Immunologic: Negative for food allergies.   Neurological: Negative for weakness and light-headedness.   Psychiatric/Behavioral: Negative for behavioral problems.     Objective     Blood pressure 150/90, pulse 65, height 170.2 cm (67\"), weight 69.6 kg (153 lb 6.4 oz), SpO2 96 %, not currently breastfeeding.    Physical Exam  Constitutional:       General: She is not in acute distress.     Appearance: She is well-developed. She is not diaphoretic.   HENT:      Head: Normocephalic and atraumatic.   Cardiovascular:      Rate and Rhythm: Normal rate and regular rhythm.      Heart sounds: Normal heart sounds. No murmur. No friction rub. No gallop.    Pulmonary:      Effort: No respiratory distress.      Breath sounds: Normal breath sounds. No wheezing or rales.   Chest:      Chest wall: No tenderness.   Abdominal:      General: Bowel sounds are normal. There is no distension.      Palpations: Abdomen is soft. There is no mass.      Tenderness: There is no abdominal tenderness. There is no guarding or rebound.      Hernia: No hernia is present.   Musculoskeletal: Normal range of motion.   Skin:     General: Skin is warm and dry.      Coloration: Skin is not pale.      Findings: No erythema or rash.   Neurological:      Mental Status: She is alert and oriented to person, place, and time.   Psychiatric:         Behavior: Behavior normal.         Thought Content: Thought content normal.         Judgment: Judgment normal.          Assessment/Plan   Mary was seen today for colon cancer " screening.    Diagnoses and all orders for this visit:    Polyp of colon, unspecified part of colon, unspecified type  -     Case Request; Standing  -     Case Request    Other orders  -     Follow Anesthesia Guidelines / Standing Orders; Future  -     Obtain Informed Consent; Future  -     sodium-potassium-magnesium sulfates (SUPREP) 17.5-3.13-1.6 GM/177ML solution oral solution; Take 1 bottle by mouth Every 12 (Twelve) Hours.        COLONOSCOPY (N/A)     Diagnosis Plan   1. Polyp of colon, unspecified part of colon, unspecified type  Case Request    dextrose 5 % and sodium chloride 0.45 % infusion    Case Request       Anticipated Surgical Procedure:  Orders Placed This Encounter   Procedures   • Follow Anesthesia Guidelines / Standing Orders     Standing Status:   Future   • Obtain Informed Consent     Standing Status:   Future     Order Specific Question:   Informed Consent Given For     Answer:   colonoscopy       The risks, benefits, and alternatives of this procedure have been discussed with the patient or the responsible party- the patient understands and agrees to proceed.

## 2020-09-29 NOTE — PATIENT INSTRUCTIONS

## 2020-10-16 RX ORDER — SOTALOL HYDROCHLORIDE 80 MG/1
80 TABLET ORAL NIGHTLY
COMMUNITY
End: 2022-10-31

## 2020-10-16 RX ORDER — SOTALOL HYDROCHLORIDE 80 MG/1
40 TABLET ORAL EVERY MORNING
COMMUNITY
End: 2021-07-27

## 2020-10-18 ENCOUNTER — LAB (OUTPATIENT)
Dept: LAB | Facility: HOSPITAL | Age: 75
End: 2020-10-18

## 2020-10-18 DIAGNOSIS — Z01.818 PREOP TESTING: Primary | ICD-10-CM

## 2020-10-18 LAB — SARS-COV-2 N GENE RESP QL NAA+PROBE: NOT DETECTED

## 2020-10-18 PROCEDURE — C9803 HOPD COVID-19 SPEC COLLECT: HCPCS

## 2020-10-18 PROCEDURE — 87635 SARS-COV-2 COVID-19 AMP PRB: CPT

## 2020-10-21 ENCOUNTER — HOSPITAL ENCOUNTER (OUTPATIENT)
Facility: HOSPITAL | Age: 75
Setting detail: HOSPITAL OUTPATIENT SURGERY
Discharge: HOME | End: 2020-10-21
Attending: INTERNAL MEDICINE | Admitting: INTERNAL MEDICINE

## 2020-10-21 ENCOUNTER — ANESTHESIA (OUTPATIENT)
Dept: GASTROENTEROLOGY | Facility: HOSPITAL | Age: 75
End: 2020-10-21

## 2020-10-21 ENCOUNTER — ANESTHESIA EVENT (OUTPATIENT)
Dept: GASTROENTEROLOGY | Facility: HOSPITAL | Age: 75
End: 2020-10-21

## 2020-10-21 VITALS
HEIGHT: 67 IN | BODY MASS INDEX: 24.01 KG/M2 | HEART RATE: 69 BPM | OXYGEN SATURATION: 97 % | TEMPERATURE: 97.1 F | SYSTOLIC BLOOD PRESSURE: 110 MMHG | RESPIRATION RATE: 18 BRPM | WEIGHT: 153 LBS | DIASTOLIC BLOOD PRESSURE: 55 MMHG

## 2020-10-21 DIAGNOSIS — K63.5 POLYP OF COLON, UNSPECIFIED PART OF COLON, UNSPECIFIED TYPE: ICD-10-CM

## 2020-10-21 PROCEDURE — 25010000002 PROPOFOL 10 MG/ML EMULSION: Performed by: NURSE ANESTHETIST, CERTIFIED REGISTERED

## 2020-10-21 PROCEDURE — G0105 COLORECTAL SCRN; HI RISK IND: HCPCS | Performed by: INTERNAL MEDICINE

## 2020-10-21 RX ORDER — LIDOCAINE HYDROCHLORIDE 20 MG/ML
INJECTION, SOLUTION INTRAVENOUS AS NEEDED
Status: DISCONTINUED | OUTPATIENT
Start: 2020-10-21 | End: 2020-10-21 | Stop reason: SURG

## 2020-10-21 RX ORDER — DEXTROSE AND SODIUM CHLORIDE 5; .45 G/100ML; G/100ML
30 INJECTION, SOLUTION INTRAVENOUS CONTINUOUS PRN
Status: DISCONTINUED | OUTPATIENT
Start: 2020-10-21 | End: 2020-10-21 | Stop reason: HOSPADM

## 2020-10-21 RX ORDER — PROPOFOL 10 MG/ML
VIAL (ML) INTRAVENOUS AS NEEDED
Status: DISCONTINUED | OUTPATIENT
Start: 2020-10-21 | End: 2020-10-21 | Stop reason: SURG

## 2020-10-21 RX ADMIN — PROPOFOL 25 MG: 10 INJECTION, EMULSION INTRAVENOUS at 12:11

## 2020-10-21 RX ADMIN — DEXTROSE AND SODIUM CHLORIDE 30 ML/HR: 5; 450 INJECTION, SOLUTION INTRAVENOUS at 11:29

## 2020-10-21 RX ADMIN — PROPOFOL 25 MG: 10 INJECTION, EMULSION INTRAVENOUS at 12:08

## 2020-10-21 RX ADMIN — PROPOFOL 50 MG: 10 INJECTION, EMULSION INTRAVENOUS at 12:01

## 2020-10-21 RX ADMIN — LIDOCAINE HYDROCHLORIDE 50 MG: 20 INJECTION, SOLUTION INTRAVENOUS at 12:01

## 2020-10-21 RX ADMIN — PROPOFOL 25 MG: 10 INJECTION, EMULSION INTRAVENOUS at 12:04

## 2020-10-21 NOTE — ANESTHESIA PREPROCEDURE EVALUATION
Anesthesia Evaluation     Patient summary reviewed and Nursing notes reviewed   NPO Solid Status: > 8 hours  NPO Liquid Status: > 6 hours           Airway   Mallampati: II  TM distance: <3 FB  Neck ROM: full  No difficulty expected  Dental      Pulmonary - normal exam   Cardiovascular - normal exam    (+) CHF ,       Neuro/Psych  GI/Hepatic/Renal/Endo      Musculoskeletal     Abdominal  - normal exam   Substance History      OB/GYN          Other      history of cancer                    Anesthesia Plan    ASA 2     general     intravenous induction

## 2020-10-21 NOTE — ANESTHESIA POSTPROCEDURE EVALUATION
Patient: Mary Tan    Procedure Summary     Date: 10/21/20 Room / Location: [facility] ENDOSCOPY 1 / [facility] ENDOSCOPY    Anesthesia Start: 1201 Anesthesia Stop: 1217    Procedure: COLONOSCOPY (N/A ) Diagnosis:       Polyp of colon, unspecified part of colon, unspecified type      (Polyp of colon, unspecified part of colon, unspecified type [K63.5])    Surgeon: Anshu Galloway MD Provider: Carl Lal CRNA    Anesthesia Type: general ASA Status: 2          Anesthesia Type: general    Vitals  No vitals data found for the desired time range.          Post Anesthesia Care and Evaluation    Patient location during evaluation: bedside  Patient participation: complete - patient participated  Level of consciousness: sleepy but conscious  Pain score: 0  Pain management: adequate  Airway patency: patent  Anesthetic complications: No anesthetic complications  PONV Status: none  Cardiovascular status: acceptable and stable  Respiratory status: acceptable  Hydration status: stable

## 2021-02-25 DIAGNOSIS — I10 ESSENTIAL HYPERTENSION: Primary | ICD-10-CM

## 2021-03-23 ENCOUNTER — BULK ORDERING (OUTPATIENT)
Dept: CASE MANAGEMENT | Facility: OTHER | Age: 76
End: 2021-03-23

## 2021-03-23 DIAGNOSIS — Z23 IMMUNIZATION DUE: ICD-10-CM

## 2021-03-31 ENCOUNTER — LAB (OUTPATIENT)
Dept: LAB | Facility: HOSPITAL | Age: 76
End: 2021-03-31

## 2021-03-31 DIAGNOSIS — I10 ESSENTIAL HYPERTENSION: ICD-10-CM

## 2021-03-31 LAB
ANION GAP SERPL CALCULATED.3IONS-SCNC: 6.6 MMOL/L (ref 5–15)
BASOPHILS # BLD AUTO: 0.09 10*3/MM3 (ref 0–0.2)
BASOPHILS NFR BLD AUTO: 1.4 % (ref 0–1.5)
BUN SERPL-MCNC: 20 MG/DL (ref 8–23)
BUN/CREAT SERPL: 17.7 (ref 7–25)
CALCIUM SPEC-SCNC: 9.4 MG/DL (ref 8.6–10.5)
CHLORIDE SERPL-SCNC: 101 MMOL/L (ref 98–107)
CO2 SERPL-SCNC: 29.4 MMOL/L (ref 22–29)
CREAT SERPL-MCNC: 1.13 MG/DL (ref 0.57–1)
DEPRECATED RDW RBC AUTO: 47.8 FL (ref 37–54)
EOSINOPHIL # BLD AUTO: 0.23 10*3/MM3 (ref 0–0.4)
EOSINOPHIL NFR BLD AUTO: 3.5 % (ref 0.3–6.2)
ERYTHROCYTE [DISTWIDTH] IN BLOOD BY AUTOMATED COUNT: 13.8 % (ref 12.3–15.4)
GFR SERPL CREATININE-BSD FRML MDRD: 47 ML/MIN/1.73
GLUCOSE SERPL-MCNC: 86 MG/DL (ref 65–99)
HCT VFR BLD AUTO: 40.5 % (ref 34–46.6)
HGB BLD-MCNC: 13.6 G/DL (ref 12–15.9)
IMM GRANULOCYTES # BLD AUTO: 0.01 10*3/MM3 (ref 0–0.05)
IMM GRANULOCYTES NFR BLD AUTO: 0.2 % (ref 0–0.5)
LYMPHOCYTES # BLD AUTO: 1.92 10*3/MM3 (ref 0.7–3.1)
LYMPHOCYTES NFR BLD AUTO: 28.8 % (ref 19.6–45.3)
MCH RBC QN AUTO: 31.6 PG (ref 26.6–33)
MCHC RBC AUTO-ENTMCNC: 33.6 G/DL (ref 31.5–35.7)
MCV RBC AUTO: 94.2 FL (ref 79–97)
MONOCYTES # BLD AUTO: 0.58 10*3/MM3 (ref 0.1–0.9)
MONOCYTES NFR BLD AUTO: 8.7 % (ref 5–12)
NEUTROPHILS NFR BLD AUTO: 3.83 10*3/MM3 (ref 1.7–7)
NEUTROPHILS NFR BLD AUTO: 57.4 % (ref 42.7–76)
NRBC BLD AUTO-RTO: 0 /100 WBC (ref 0–0.2)
PLATELET # BLD AUTO: 211 10*3/MM3 (ref 140–450)
PMV BLD AUTO: 11.7 FL (ref 6–12)
POTASSIUM SERPL-SCNC: 4.8 MMOL/L (ref 3.5–5.2)
RBC # BLD AUTO: 4.3 10*6/MM3 (ref 3.77–5.28)
SODIUM SERPL-SCNC: 137 MMOL/L (ref 136–145)
WBC # BLD AUTO: 6.66 10*3/MM3 (ref 3.4–10.8)

## 2021-03-31 PROCEDURE — 80048 BASIC METABOLIC PNL TOTAL CA: CPT

## 2021-03-31 PROCEDURE — 36415 COLL VENOUS BLD VENIPUNCTURE: CPT

## 2021-03-31 PROCEDURE — 85025 COMPLETE CBC W/AUTO DIFF WBC: CPT

## 2021-07-27 ENCOUNTER — OFFICE VISIT (OUTPATIENT)
Dept: FAMILY MEDICINE CLINIC | Facility: CLINIC | Age: 76
End: 2021-07-27

## 2021-07-27 ENCOUNTER — LAB (OUTPATIENT)
Dept: LAB | Facility: HOSPITAL | Age: 76
End: 2021-07-27

## 2021-07-27 VITALS
HEART RATE: 50 BPM | SYSTOLIC BLOOD PRESSURE: 120 MMHG | HEIGHT: 67 IN | BODY MASS INDEX: 24.17 KG/M2 | OXYGEN SATURATION: 99 % | DIASTOLIC BLOOD PRESSURE: 66 MMHG | WEIGHT: 154 LBS

## 2021-07-27 DIAGNOSIS — R94.6 ABNORMAL RESULTS OF THYROID FUNCTION STUDIES: ICD-10-CM

## 2021-07-27 DIAGNOSIS — Z11.59 NEED FOR HEPATITIS C SCREENING TEST: ICD-10-CM

## 2021-07-27 DIAGNOSIS — Z78.0 POSTMENOPAUSAL: ICD-10-CM

## 2021-07-27 DIAGNOSIS — Z13.220 ENCOUNTER FOR LIPID SCREENING FOR CARDIOVASCULAR DISEASE: ICD-10-CM

## 2021-07-27 DIAGNOSIS — Z13.6 ENCOUNTER FOR LIPID SCREENING FOR CARDIOVASCULAR DISEASE: ICD-10-CM

## 2021-07-27 DIAGNOSIS — Z13.29 SCREENING FOR THYROID DISORDER: ICD-10-CM

## 2021-07-27 DIAGNOSIS — Z12.31 ENCOUNTER FOR SCREENING MAMMOGRAM FOR MALIGNANT NEOPLASM OF BREAST: ICD-10-CM

## 2021-07-27 DIAGNOSIS — I49.3 FREQUENT PVCS: Primary | ICD-10-CM

## 2021-07-27 DIAGNOSIS — I49.3 FREQUENT PVCS: ICD-10-CM

## 2021-07-27 DIAGNOSIS — Z76.89 ENCOUNTER TO ESTABLISH CARE: ICD-10-CM

## 2021-07-27 LAB
BASOPHILS # BLD AUTO: 0.07 10*3/MM3 (ref 0–0.2)
BASOPHILS NFR BLD AUTO: 1.5 % (ref 0–1.5)
DEPRECATED RDW RBC AUTO: 44.6 FL (ref 37–54)
EOSINOPHIL # BLD AUTO: 0.19 10*3/MM3 (ref 0–0.4)
EOSINOPHIL NFR BLD AUTO: 4.2 % (ref 0.3–6.2)
ERYTHROCYTE [DISTWIDTH] IN BLOOD BY AUTOMATED COUNT: 13.3 % (ref 12.3–15.4)
HCT VFR BLD AUTO: 39.6 % (ref 34–46.6)
HGB BLD-MCNC: 13.6 G/DL (ref 12–15.9)
IMM GRANULOCYTES # BLD AUTO: 0.01 10*3/MM3 (ref 0–0.05)
IMM GRANULOCYTES NFR BLD AUTO: 0.2 % (ref 0–0.5)
LYMPHOCYTES # BLD AUTO: 1.65 10*3/MM3 (ref 0.7–3.1)
LYMPHOCYTES NFR BLD AUTO: 36.4 % (ref 19.6–45.3)
MCH RBC QN AUTO: 31.5 PG (ref 26.6–33)
MCHC RBC AUTO-ENTMCNC: 34.3 G/DL (ref 31.5–35.7)
MCV RBC AUTO: 91.7 FL (ref 79–97)
MONOCYTES # BLD AUTO: 0.39 10*3/MM3 (ref 0.1–0.9)
MONOCYTES NFR BLD AUTO: 8.6 % (ref 5–12)
NEUTROPHILS NFR BLD AUTO: 2.22 10*3/MM3 (ref 1.7–7)
NEUTROPHILS NFR BLD AUTO: 49.1 % (ref 42.7–76)
NRBC BLD AUTO-RTO: 0 /100 WBC (ref 0–0.2)
PLATELET # BLD AUTO: 224 10*3/MM3 (ref 140–450)
PMV BLD AUTO: 11.6 FL (ref 6–12)
RBC # BLD AUTO: 4.32 10*6/MM3 (ref 3.77–5.28)
WBC # BLD AUTO: 4.53 10*3/MM3 (ref 3.4–10.8)

## 2021-07-27 PROCEDURE — 84443 ASSAY THYROID STIM HORMONE: CPT

## 2021-07-27 PROCEDURE — 85025 COMPLETE CBC W/AUTO DIFF WBC: CPT

## 2021-07-27 PROCEDURE — 86803 HEPATITIS C AB TEST: CPT

## 2021-07-27 PROCEDURE — 84439 ASSAY OF FREE THYROXINE: CPT

## 2021-07-27 PROCEDURE — 80053 COMPREHEN METABOLIC PANEL: CPT

## 2021-07-27 PROCEDURE — 99203 OFFICE O/P NEW LOW 30 MIN: CPT | Performed by: FAMILY MEDICINE

## 2021-07-27 PROCEDURE — 80061 LIPID PANEL: CPT

## 2021-07-27 PROCEDURE — 36415 COLL VENOUS BLD VENIPUNCTURE: CPT

## 2021-07-27 RX ORDER — FUROSEMIDE 20 MG/1
20 TABLET ORAL DAILY
COMMUNITY
Start: 2021-06-28 | End: 2022-10-31

## 2021-07-28 LAB
ALBUMIN SERPL-MCNC: 4.4 G/DL (ref 3.5–5.2)
ALBUMIN/GLOB SERPL: 1.8 G/DL
ALP SERPL-CCNC: 68 U/L (ref 39–117)
ALT SERPL W P-5'-P-CCNC: 16 U/L (ref 1–33)
ANION GAP SERPL CALCULATED.3IONS-SCNC: 10 MMOL/L (ref 5–15)
AST SERPL-CCNC: 24 U/L (ref 1–32)
BILIRUB SERPL-MCNC: 0.4 MG/DL (ref 0–1.2)
BUN SERPL-MCNC: 19 MG/DL (ref 8–23)
BUN/CREAT SERPL: 20.9 (ref 7–25)
CALCIUM SPEC-SCNC: 9 MG/DL (ref 8.6–10.5)
CHLORIDE SERPL-SCNC: 103 MMOL/L (ref 98–107)
CHOLEST SERPL-MCNC: 167 MG/DL (ref 0–200)
CO2 SERPL-SCNC: 26 MMOL/L (ref 22–29)
CREAT SERPL-MCNC: 0.91 MG/DL (ref 0.57–1)
GFR SERPL CREATININE-BSD FRML MDRD: 60 ML/MIN/1.73
GLOBULIN UR ELPH-MCNC: 2.4 GM/DL
GLUCOSE SERPL-MCNC: 84 MG/DL (ref 65–99)
HCV AB SER DONR QL: NORMAL
HDLC SERPL-MCNC: 56 MG/DL (ref 40–60)
LDLC SERPL CALC-MCNC: 90 MG/DL (ref 0–100)
LDLC/HDLC SERPL: 1.57 {RATIO}
POTASSIUM SERPL-SCNC: 4.5 MMOL/L (ref 3.5–5.2)
PROT SERPL-MCNC: 6.8 G/DL (ref 6–8.5)
SODIUM SERPL-SCNC: 139 MMOL/L (ref 136–145)
T4 FREE SERPL-MCNC: 1.25 NG/DL (ref 0.93–1.7)
TRIGL SERPL-MCNC: 116 MG/DL (ref 0–150)
TSH SERPL DL<=0.05 MIU/L-ACNC: 0.9 UIU/ML (ref 0.27–4.2)
VLDLC SERPL-MCNC: 21 MG/DL (ref 5–40)

## 2021-08-04 ENCOUNTER — TELEPHONE (OUTPATIENT)
Dept: FAMILY MEDICINE CLINIC | Facility: CLINIC | Age: 76
End: 2021-08-04

## 2021-08-05 NOTE — TELEPHONE ENCOUNTER
Per Dr. Howell, Ms Tan has been called with recent lab results and recommendations.  Continue current medications and follow-up as planned or sooner if any problems.

## 2021-09-23 ENCOUNTER — TELEPHONE (OUTPATIENT)
Dept: FAMILY MEDICINE CLINIC | Facility: CLINIC | Age: 76
End: 2021-09-23

## 2021-09-23 NOTE — TELEPHONE ENCOUNTER
Please call and let patient know that DEXA bone scan showed normal bone density done at outside facility.  Have not received report on mammogram yet, but will contact patient once this is available.  ThanksTIANA

## 2021-09-23 NOTE — TELEPHONE ENCOUNTER
Per Dr. Howell, Ms. Tan has been called with recent DEXA Bone Density Scan results and recommendations.   Continue current medications follow-up as planned or sooner if any problems

## 2021-10-01 ENCOUNTER — TELEPHONE (OUTPATIENT)
Dept: FAMILY MEDICINE CLINIC | Facility: CLINIC | Age: 76
End: 2021-10-01

## 2021-10-01 DIAGNOSIS — Z78.0 POSTMENOPAUSAL: ICD-10-CM

## 2021-10-01 NOTE — TELEPHONE ENCOUNTER
Please call and let patient know that mammogram and DEXA bone scan done at outside facility were normal.  Repeat mammogram in 1 year and bone scan in 2 years.  ThanksTIANA

## 2021-10-13 DIAGNOSIS — Z12.31 ENCOUNTER FOR SCREENING MAMMOGRAM FOR MALIGNANT NEOPLASM OF BREAST: ICD-10-CM

## 2021-10-19 NOTE — TELEPHONE ENCOUNTER
Please attempt to contact patient.  Mail letter if not able to reach by phone.  Thanks, TIANA Howell

## 2021-10-28 ENCOUNTER — OFFICE VISIT (OUTPATIENT)
Dept: FAMILY MEDICINE CLINIC | Facility: CLINIC | Age: 76
End: 2021-10-28

## 2021-10-28 VITALS
DIASTOLIC BLOOD PRESSURE: 74 MMHG | SYSTOLIC BLOOD PRESSURE: 138 MMHG | HEIGHT: 67 IN | HEART RATE: 60 BPM | BODY MASS INDEX: 23.7 KG/M2 | WEIGHT: 151 LBS | OXYGEN SATURATION: 95 %

## 2021-10-28 DIAGNOSIS — I49.3 FREQUENT PVCS: ICD-10-CM

## 2021-10-28 DIAGNOSIS — Z00.00 MEDICARE ANNUAL WELLNESS VISIT, SUBSEQUENT: Primary | ICD-10-CM

## 2021-10-28 PROCEDURE — 1126F AMNT PAIN NOTED NONE PRSNT: CPT | Performed by: FAMILY MEDICINE

## 2021-10-28 PROCEDURE — 96160 PT-FOCUSED HLTH RISK ASSMT: CPT | Performed by: FAMILY MEDICINE

## 2021-10-28 PROCEDURE — 1170F FXNL STATUS ASSESSED: CPT | Performed by: FAMILY MEDICINE

## 2021-10-28 PROCEDURE — 1159F MED LIST DOCD IN RCRD: CPT | Performed by: FAMILY MEDICINE

## 2021-10-28 PROCEDURE — G0439 PPPS, SUBSEQ VISIT: HCPCS | Performed by: FAMILY MEDICINE

## 2022-04-28 ENCOUNTER — OFFICE VISIT (OUTPATIENT)
Dept: FAMILY MEDICINE CLINIC | Facility: CLINIC | Age: 77
End: 2022-04-28

## 2022-04-28 VITALS
OXYGEN SATURATION: 97 % | BODY MASS INDEX: 22.44 KG/M2 | DIASTOLIC BLOOD PRESSURE: 70 MMHG | SYSTOLIC BLOOD PRESSURE: 124 MMHG | HEART RATE: 70 BPM | HEIGHT: 67 IN | WEIGHT: 143 LBS

## 2022-04-28 DIAGNOSIS — I49.3 FREQUENT PVCS: Primary | ICD-10-CM

## 2022-04-28 DIAGNOSIS — Z71.85 IMMUNIZATION COUNSELING: ICD-10-CM

## 2022-04-28 PROCEDURE — 99213 OFFICE O/P EST LOW 20 MIN: CPT | Performed by: FAMILY MEDICINE

## 2022-04-28 NOTE — PROGRESS NOTES
"Chief Complaint  Frequent PVCs    Subjective    History of Present Illness {  Problem List  Visit  Diagnosis   Encounters  Notes  Medications  Labs  Result Review Imaging  Media :23}     Mary Tan presents to [facility] for     Chief Complaint   Patient presents with   • Frequent PVCs      Patient seen today for follow-up.  Overall she has been doing well.  Feeling well.  Not having much trouble with palpitations.  She did follow with new electrophysiologist.  Was continued on sotalol 80 mg daily.  No new concerns.      Current Outpatient Medications:   •  Ascorbic Acid (VITAMIN C PO), Take 1 tablet by mouth Daily., Disp: , Rfl:   •  ASPIRIN PO, 81 mg. Every day Oral, Disp: , Rfl:   •  Biotin 10 MG tablet, Take 2 capsules by mouth Daily., Disp: , Rfl:   •  furosemide (LASIX) 20 MG tablet, Take 20 mg by mouth Daily., Disp: , Rfl:   •  Magnesium Oxide (MAG-OXIDE PO), Take  by mouth. 2 times per day Oral, Disp: , Rfl:   •  methocarbamol (ROBAXIN) 750 MG tablet, , Disp: , Rfl:   •  sildenafil (VIAGRA) 100 MG tablet, Take 20 mg by mouth 3 (Three) Times a Day., Disp: , Rfl:   •  sotalol (BETAPACE) 80 MG tablet, Take 80 mg by mouth Every Night. Patient takes .5 am and .5 pm, Disp: , Rfl:      Objective       Vital Signs:   /70   Pulse 70   Ht 170.2 cm (67\")   Wt 64.9 kg (143 lb)   SpO2 97%   BMI 22.40 kg/m²     Physical Exam  Vitals reviewed.   Constitutional:       General: She is not in acute distress.     Appearance: She is well-developed.   Cardiovascular:      Rate and Rhythm: Normal rate and regular rhythm.      Heart sounds: Normal heart sounds. No murmur heard.  Pulmonary:      Effort: Pulmonary effort is normal. No respiratory distress.      Breath sounds: Normal breath sounds. No wheezing or rales.   Skin:     General: Skin is warm and dry.   Neurological:      Mental Status: She is alert and oriented to person, place, and " time.        Result Review :{ Labs  Result Review  Imaging  Med Tab  Media :23}   The following data was reviewed by: Aria Howell MD on 04/28/2022    Common labs    Common Labsle 7/27/21 7/27/21 7/27/21    1118 1118 1118   Glucose   84   BUN   19   Creatinine   0.91   eGFR Non African Am   60 (A)   Sodium   139   Potassium   4.5   Chloride   103   Calcium   9.0   Albumin   4.40   Total Bilirubin   0.4   Alkaline Phosphatase   68   AST (SGOT)   24   ALT (SGPT)   16   WBC 4.53     Hemoglobin 13.6     Hematocrit 39.6     Platelets 224     Total Cholesterol  167    Triglycerides  116    HDL Cholesterol  56    LDL Cholesterol   90    (A) Abnormal value                    Assessment and Plan {CC Problem List  Visit Diagnosis  ROS  Review (Popup)  Health Maintenance  Quality  BestPractice  Medications  SmartSets  SnapShot Encounters  Media :23}   Diagnoses and all orders for this visit:    1. Frequent PVCs (Primary)    2. Immunization counseling       Frequent PVCs, on sotalol -controlled at this time  Patient will continue following with electrophysiologist  Has lab work scheduled with this provider in about 6 weeks  We will check back on labs once completed for review    Discussed immunization recommendations for second COVID booster  Patient will consider over the next few months    Follow Up {Instructions Charge Capture  Follow-up Communications :23}   Return in about 6 months (around 10/31/2022) for Medicare Wellness.  Patient was given instructions and counseling regarding her condition or for health maintenance advice. Please see specific information pulled into the AVS if appropriate.            This document has been electronically signed by Aria Howell MD

## 2022-06-02 ENCOUNTER — TRANSCRIBE ORDERS (OUTPATIENT)
Dept: LAB | Facility: HOSPITAL | Age: 77
End: 2022-06-02

## 2022-06-02 ENCOUNTER — LAB (OUTPATIENT)
Dept: LAB | Facility: HOSPITAL | Age: 77
End: 2022-06-02

## 2022-06-02 DIAGNOSIS — I49.3 VENTRICULAR PREMATURE BEATS: Primary | ICD-10-CM

## 2022-06-02 DIAGNOSIS — I49.3 VENTRICULAR PREMATURE BEATS: ICD-10-CM

## 2022-06-02 LAB
ANION GAP SERPL CALCULATED.3IONS-SCNC: 10.4 MMOL/L (ref 5–15)
BUN SERPL-MCNC: 17 MG/DL (ref 8–23)
BUN/CREAT SERPL: 17.9 (ref 7–25)
CALCIUM SPEC-SCNC: 8.7 MG/DL (ref 8.6–10.5)
CHLORIDE SERPL-SCNC: 103 MMOL/L (ref 98–107)
CO2 SERPL-SCNC: 27.6 MMOL/L (ref 22–29)
CREAT SERPL-MCNC: 0.95 MG/DL (ref 0.57–1)
EGFRCR SERPLBLD CKD-EPI 2021: 62.2 ML/MIN/1.73
GLUCOSE SERPL-MCNC: 77 MG/DL (ref 65–99)
MAGNESIUM SERPL-MCNC: 2.3 MG/DL (ref 1.6–2.4)
POTASSIUM SERPL-SCNC: 4.1 MMOL/L (ref 3.5–5.2)
SODIUM SERPL-SCNC: 141 MMOL/L (ref 136–145)

## 2022-06-02 PROCEDURE — 83735 ASSAY OF MAGNESIUM: CPT

## 2022-06-02 PROCEDURE — 80048 BASIC METABOLIC PNL TOTAL CA: CPT

## 2022-06-02 PROCEDURE — 36415 COLL VENOUS BLD VENIPUNCTURE: CPT

## 2022-10-31 ENCOUNTER — OFFICE VISIT (OUTPATIENT)
Dept: FAMILY MEDICINE CLINIC | Facility: CLINIC | Age: 77
End: 2022-10-31

## 2022-10-31 VITALS
HEIGHT: 67 IN | HEART RATE: 65 BPM | DIASTOLIC BLOOD PRESSURE: 80 MMHG | BODY MASS INDEX: 21.66 KG/M2 | WEIGHT: 138 LBS | OXYGEN SATURATION: 99 % | SYSTOLIC BLOOD PRESSURE: 120 MMHG

## 2022-10-31 DIAGNOSIS — I49.3 FREQUENT PVCS: ICD-10-CM

## 2022-10-31 DIAGNOSIS — Z13.220 ENCOUNTER FOR LIPID SCREENING FOR CARDIOVASCULAR DISEASE: ICD-10-CM

## 2022-10-31 DIAGNOSIS — E03.8 SUBCLINICAL HYPOTHYROIDISM: ICD-10-CM

## 2022-10-31 DIAGNOSIS — Z00.00 MEDICARE ANNUAL WELLNESS VISIT, SUBSEQUENT: Primary | ICD-10-CM

## 2022-10-31 DIAGNOSIS — Z13.6 ENCOUNTER FOR LIPID SCREENING FOR CARDIOVASCULAR DISEASE: ICD-10-CM

## 2022-10-31 DIAGNOSIS — D49.4 NEOPLASM OF BLADDER: ICD-10-CM

## 2022-10-31 PROCEDURE — 1170F FXNL STATUS ASSESSED: CPT | Performed by: FAMILY MEDICINE

## 2022-10-31 PROCEDURE — G0439 PPPS, SUBSEQ VISIT: HCPCS | Performed by: FAMILY MEDICINE

## 2022-10-31 PROCEDURE — 1159F MED LIST DOCD IN RCRD: CPT | Performed by: FAMILY MEDICINE

## 2022-10-31 PROCEDURE — 1126F AMNT PAIN NOTED NONE PRSNT: CPT | Performed by: FAMILY MEDICINE

## 2022-10-31 PROCEDURE — 96160 PT-FOCUSED HLTH RISK ASSMT: CPT | Performed by: FAMILY MEDICINE

## 2022-10-31 RX ORDER — SOTALOL HYDROCHLORIDE 80 MG/1
40 TABLET ORAL 2 TIMES DAILY
Status: SHIPPED | COMMUNITY
Start: 2022-10-31

## 2022-10-31 NOTE — PROGRESS NOTES
The ABCs of the Annual Wellness Visit  Subsequent Medicare Wellness Visit    Chief Complaint   Patient presents with   • Medicare Wellness-subsequent     Subjective    History of Present Illness:  Mary Tan is a 77 y.o. female who presents for a Subsequent Medicare Wellness Visit.    The following portions of the patient's history were reviewed and   updated as appropriate: allergies, current medications, past family history, past medical history, past social history, past surgical history and problem list.    Compared to one year ago, the patient feels her physical   health is the same.    Compared to one year ago, the patient feels her mental   health is the same.  Has support from sister, who lives with her.     Recent Hospitalizations:  She was not admitted to the hospital during the last year.       Current Medical Providers:  Patient Care Team:  Aria Howell MD as PCP - General (Family Medicine)  Antonio Mitchell MD (Cardiac Electrophysiology)    Outpatient Medications Prior to Visit   Medication Sig Dispense Refill   • Ascorbic Acid (VITAMIN C PO) Take 1 tablet by mouth Daily.     • ASPIRIN PO 81 mg. Every day Oral     • Biotin 10 MG tablet Take 2 capsules by mouth Daily.     • Magnesium Oxide (MAG-OXIDE PO) Take  by mouth. 2 times per day Oral     • sotalol (BETAPACE) 80 MG tablet Take 0.5 tablets by mouth 2 (Two) Times a Day. Patient takes .5 am and .5 pm     • sotalol (BETAPACE) 80 MG tablet Take 80 mg by mouth Every Night. Patient takes .5 am and .5 pm     • furosemide (LASIX) 20 MG tablet Take 20 mg by mouth Daily.     • methocarbamol (ROBAXIN) 750 MG tablet      • sildenafil (VIAGRA) 100 MG tablet Take 20 mg by mouth 3 (Three) Times a Day.       No facility-administered medications prior to visit.       No opioid medication identified on active medication list. I have reviewed chart for other potential  high risk medication/s and harmful drug interactions in the  "elderly.          Aspirin is on active medication list. Aspirin use is indicated based on review of current medical condition/s. Pros and cons of this therapy have been discussed today. Benefits of this medication outweigh potential harm.  Patient has been encouraged to continue taking this medication.  .      Patient Active Problem List   Diagnosis   • Menopausal state   • H/O hysterectomy for benign disease   • Neoplasm of bladder   • Chest pain   • Abnormal thyroid function test   • Polyp of colon     Advance Care Planning  Advance Directive is not on file.  ACP discussion was held with the patient during this visit. Patient does not have an advance directive, declines further assistance.          Objective    Vitals:    10/31/22 0950   BP: 120/80   Pulse: 65   SpO2: 99%   Weight: 62.6 kg (138 lb)   Height: 170.2 cm (67\")   PainSc: 0-No pain     Estimated body mass index is 21.61 kg/m² as calculated from the following:    Height as of this encounter: 170.2 cm (67\").    Weight as of this encounter: 62.6 kg (138 lb).    BMI is within normal parameters. No other follow-up for BMI required.      Does the patient have evidence of cognitive impairment? No    Physical Exam  Vitals reviewed.   Constitutional:       General: She is not in acute distress.     Appearance: She is well-developed.   Cardiovascular:      Rate and Rhythm: Normal rate and regular rhythm.      Heart sounds: Normal heart sounds. No murmur heard.  Pulmonary:      Effort: Pulmonary effort is normal. No respiratory distress.      Breath sounds: Normal breath sounds. No wheezing or rales.   Skin:     General: Skin is warm and dry.   Neurological:      Mental Status: She is alert and oriented to person, place, and time.                 HEALTH RISK ASSESSMENT    Smoking Status:  Social History     Tobacco Use   Smoking Status Former   Smokeless Tobacco Never   Tobacco Comments    qUIT 21 YEARS AGO      Alcohol Consumption:  Social History     Substance " and Sexual Activity   Alcohol Use Yes   • Alcohol/week: 1.0 standard drink   • Types: 1 Glasses of wine per week    Comment: 3 TIMES WEEKLY     Fall Risk Screen:    DANIKA Fall Risk Assessment was completed, and patient is at LOW risk for falls.Assessment completed on:4/28/2022    Depression Screening:  PHQ-2/PHQ-9 Depression Screening 10/31/2022   Retired PHQ-9 Total Score -   Retired Total Score -   Little Interest or Pleasure in Doing Things 0-->not at all   Feeling Down, Depressed or Hopeless 0-->not at all   PHQ-9: Brief Depression Severity Measure Score 0       Health Habits and Functional and Cognitive Screening:  Functional & Cognitive Status 10/31/2022   Do you have difficulty preparing food and eating? No   Do you have difficulty bathing yourself, getting dressed or grooming yourself? No   Do you have difficulty using the toilet? No   Do you have difficulty moving around from place to place? No   Do you have trouble with steps or getting out of a bed or a chair? No   Current Diet Well Balanced Diet   Dental Exam Up to date   Eye Exam Up to date   Exercise (times per week) 4 times per week   Current Exercises Include Walking   Do you need help using the phone?  No   Are you deaf or do you have serious difficulty hearing?  No   Do you need help with transportation? No   Do you need help shopping? No   Do you need help preparing meals?  No   Do you need help with housework?  No   Do you need help with laundry? No   Do you need help taking your medications? No   Do you need help managing money? No   Do you ever drive or ride in a car without wearing a seat belt? No   Have you felt unusual stress, anger or loneliness in the last month? No   Who do you live with? Alone   If you need help, do you have trouble finding someone available to you? No   Have you been bothered in the last four weeks by sexual problems? No   Do you have difficulty concentrating, remembering or making decisions? No       Age-appropriate  Screening Schedule:  Refer to the list below for future screening recommendations based on patient's age, sex and/or medical conditions. Orders for these recommended tests are listed in the plan section. The patient has been provided with a written plan.    Health Maintenance   Topic Date Due   • TDAP/TD VACCINES (1 - Tdap) Never done   • ZOSTER VACCINE (1 of 2) Never done   • DXA SCAN  10/01/2023   • MAMMOGRAM  10/13/2023   • INFLUENZA VACCINE  Completed                Assessment & Plan   CMS Preventative Services Quick Reference  Risk Factors Identified During Encounter  Cardiovascular Disease  The above risks/problems have been discussed with the patient.  Follow up actions/plans if indicated are seen below in the Assessment/Plan Section.  Pertinent information has been shared with the patient in the After Visit Summary.    Diagnoses and all orders for this visit:    1. Medicare annual wellness visit, subsequent (Primary)  -     Lipid Panel; Future  -     CBC & Differential; Future  -     Comprehensive Metabolic Panel; Future    2. Encounter for lipid screening for cardiovascular disease  -     Lipid Panel; Future    3. Subclinical hypothyroidism  -     TSH; Future    4. Neoplasm of bladder  -     Urinalysis With Microscopic - Urine, Clean Catch; Future    5. Frequent PVCs  -     Lipid Panel; Future  -     CBC & Differential; Future  -     Comprehensive Metabolic Panel; Future      Medicare wellness visit complete.    Check labs as above  History of bladder cancer, yearly urinalysis  Continue following with cardiology       Follow Up:   Return in about 1 year (around 10/31/2023) for Medicare Wellness, Recheck.     An After Visit Summary and PPPS were made available to the patient.                       This document has been electronically signed by Aria Howell MD

## 2022-11-11 ENCOUNTER — LAB (OUTPATIENT)
Dept: LAB | Facility: HOSPITAL | Age: 77
End: 2022-11-11

## 2022-11-11 DIAGNOSIS — Z00.00 MEDICARE ANNUAL WELLNESS VISIT, SUBSEQUENT: ICD-10-CM

## 2022-11-11 DIAGNOSIS — D49.4 NEOPLASM OF BLADDER: ICD-10-CM

## 2022-11-11 DIAGNOSIS — Z13.6 ENCOUNTER FOR LIPID SCREENING FOR CARDIOVASCULAR DISEASE: ICD-10-CM

## 2022-11-11 DIAGNOSIS — Z13.220 ENCOUNTER FOR LIPID SCREENING FOR CARDIOVASCULAR DISEASE: ICD-10-CM

## 2022-11-11 DIAGNOSIS — E03.8 SUBCLINICAL HYPOTHYROIDISM: ICD-10-CM

## 2022-11-11 DIAGNOSIS — I49.3 FREQUENT PVCS: ICD-10-CM

## 2022-11-11 PROCEDURE — 36415 COLL VENOUS BLD VENIPUNCTURE: CPT

## 2022-11-11 PROCEDURE — 80061 LIPID PANEL: CPT

## 2022-11-11 PROCEDURE — 85025 COMPLETE CBC W/AUTO DIFF WBC: CPT

## 2022-11-11 PROCEDURE — 81001 URINALYSIS AUTO W/SCOPE: CPT

## 2022-11-11 PROCEDURE — 84443 ASSAY THYROID STIM HORMONE: CPT

## 2022-11-11 PROCEDURE — 80053 COMPREHEN METABOLIC PANEL: CPT

## 2022-11-12 LAB
ALBUMIN SERPL-MCNC: 4.1 G/DL (ref 3.5–5.2)
ALBUMIN/GLOB SERPL: 1.5 G/DL
ALP SERPL-CCNC: 72 U/L (ref 39–117)
ALT SERPL W P-5'-P-CCNC: 18 U/L (ref 1–33)
ANION GAP SERPL CALCULATED.3IONS-SCNC: 8.4 MMOL/L (ref 5–15)
AST SERPL-CCNC: 23 U/L (ref 1–32)
BACTERIA UR QL AUTO: ABNORMAL /HPF
BASOPHILS # BLD AUTO: 0.05 10*3/MM3 (ref 0–0.2)
BASOPHILS NFR BLD AUTO: 0.9 % (ref 0–1.5)
BILIRUB SERPL-MCNC: 0.6 MG/DL (ref 0–1.2)
BILIRUB UR QL STRIP: NEGATIVE
BUN SERPL-MCNC: 18 MG/DL (ref 8–23)
BUN/CREAT SERPL: 18.9 (ref 7–25)
CALCIUM SPEC-SCNC: 8.8 MG/DL (ref 8.6–10.5)
CHLORIDE SERPL-SCNC: 103 MMOL/L (ref 98–107)
CHOLEST SERPL-MCNC: 175 MG/DL (ref 0–200)
CLARITY UR: CLEAR
CO2 SERPL-SCNC: 29.6 MMOL/L (ref 22–29)
COLOR UR: YELLOW
CREAT SERPL-MCNC: 0.95 MG/DL (ref 0.57–1)
DEPRECATED RDW RBC AUTO: 43.3 FL (ref 37–54)
EGFRCR SERPLBLD CKD-EPI 2021: 61.8 ML/MIN/1.73
EOSINOPHIL # BLD AUTO: 0.08 10*3/MM3 (ref 0–0.4)
EOSINOPHIL NFR BLD AUTO: 1.4 % (ref 0.3–6.2)
ERYTHROCYTE [DISTWIDTH] IN BLOOD BY AUTOMATED COUNT: 13.3 % (ref 12.3–15.4)
GLOBULIN UR ELPH-MCNC: 2.7 GM/DL
GLUCOSE SERPL-MCNC: 102 MG/DL (ref 65–99)
GLUCOSE UR STRIP-MCNC: NEGATIVE MG/DL
HCT VFR BLD AUTO: 41.2 % (ref 34–46.6)
HDLC SERPL-MCNC: 77 MG/DL (ref 40–60)
HGB BLD-MCNC: 14.2 G/DL (ref 12–15.9)
HGB UR QL STRIP.AUTO: NEGATIVE
HYALINE CASTS UR QL AUTO: ABNORMAL /LPF
IMM GRANULOCYTES # BLD AUTO: 0.02 10*3/MM3 (ref 0–0.05)
IMM GRANULOCYTES NFR BLD AUTO: 0.3 % (ref 0–0.5)
KETONES UR QL STRIP: NEGATIVE
LDLC SERPL CALC-MCNC: 84 MG/DL (ref 0–100)
LDLC/HDLC SERPL: 1.08 {RATIO}
LEUKOCYTE ESTERASE UR QL STRIP.AUTO: NEGATIVE
LYMPHOCYTES # BLD AUTO: 1.61 10*3/MM3 (ref 0.7–3.1)
LYMPHOCYTES NFR BLD AUTO: 27.9 % (ref 19.6–45.3)
MCH RBC QN AUTO: 30.7 PG (ref 26.6–33)
MCHC RBC AUTO-ENTMCNC: 34.5 G/DL (ref 31.5–35.7)
MCV RBC AUTO: 89 FL (ref 79–97)
MONOCYTES # BLD AUTO: 0.48 10*3/MM3 (ref 0.1–0.9)
MONOCYTES NFR BLD AUTO: 8.3 % (ref 5–12)
MUCOUS THREADS URNS QL MICRO: ABNORMAL /HPF
NEUTROPHILS NFR BLD AUTO: 3.53 10*3/MM3 (ref 1.7–7)
NEUTROPHILS NFR BLD AUTO: 61.2 % (ref 42.7–76)
NITRITE UR QL STRIP: NEGATIVE
NRBC BLD AUTO-RTO: 0 /100 WBC (ref 0–0.2)
PH UR STRIP.AUTO: 6 [PH] (ref 5–8)
PLATELET # BLD AUTO: 258 10*3/MM3 (ref 140–450)
PMV BLD AUTO: 10.6 FL (ref 6–12)
POTASSIUM SERPL-SCNC: 4.4 MMOL/L (ref 3.5–5.2)
PROT SERPL-MCNC: 6.8 G/DL (ref 6–8.5)
PROT UR QL STRIP: ABNORMAL
RBC # BLD AUTO: 4.63 10*6/MM3 (ref 3.77–5.28)
RBC # UR STRIP: ABNORMAL /HPF
REF LAB TEST METHOD: ABNORMAL
SODIUM SERPL-SCNC: 141 MMOL/L (ref 136–145)
SP GR UR STRIP: 1.02 (ref 1–1.03)
SQUAMOUS #/AREA URNS HPF: ABNORMAL /HPF
TRIGL SERPL-MCNC: 75 MG/DL (ref 0–150)
TSH SERPL DL<=0.05 MIU/L-ACNC: 0.94 UIU/ML (ref 0.27–4.2)
UROBILINOGEN UR QL STRIP: ABNORMAL
VLDLC SERPL-MCNC: 14 MG/DL (ref 5–40)
WBC # UR STRIP: ABNORMAL /HPF
WBC NRBC COR # BLD: 5.77 10*3/MM3 (ref 3.4–10.8)

## 2022-11-14 ENCOUNTER — TELEPHONE (OUTPATIENT)
Dept: FAMILY MEDICINE CLINIC | Facility: CLINIC | Age: 77
End: 2022-11-14

## 2022-11-14 NOTE — TELEPHONE ENCOUNTER
Per Dr. Howell, Ms. Tan has been called with recent lab results and recommendations.   Continue current medications and follow-up as planned or sooner if any problems

## 2022-12-21 ENCOUNTER — LAB (OUTPATIENT)
Dept: LAB | Facility: HOSPITAL | Age: 77
End: 2022-12-21

## 2022-12-21 ENCOUNTER — TRANSCRIBE ORDERS (OUTPATIENT)
Dept: LAB | Facility: HOSPITAL | Age: 77
End: 2022-12-21

## 2022-12-21 DIAGNOSIS — Z51.81 ENCOUNTER FOR MONITORING SOTALOL THERAPY: ICD-10-CM

## 2022-12-21 DIAGNOSIS — I49.3 PREMATURE VENTRICULAR CONTRACTIONS (PVCS) (VPCS): Primary | ICD-10-CM

## 2022-12-21 DIAGNOSIS — Z79.899 ENCOUNTER FOR MONITORING SOTALOL THERAPY: ICD-10-CM

## 2022-12-21 LAB — MAGNESIUM SERPL-MCNC: 2.2 MG/DL (ref 1.6–2.4)

## 2022-12-21 PROCEDURE — 83735 ASSAY OF MAGNESIUM: CPT | Performed by: INTERNAL MEDICINE

## 2022-12-21 PROCEDURE — 36415 COLL VENOUS BLD VENIPUNCTURE: CPT | Performed by: INTERNAL MEDICINE

## 2023-08-10 ENCOUNTER — LAB (OUTPATIENT)
Dept: LAB | Facility: HOSPITAL | Age: 78
End: 2023-08-10
Payer: MEDICARE

## 2023-08-10 ENCOUNTER — TRANSCRIBE ORDERS (OUTPATIENT)
Dept: LAB | Facility: HOSPITAL | Age: 78
End: 2023-08-10
Payer: MEDICARE

## 2023-08-10 DIAGNOSIS — Z79.899 LONG TERM CURRENT USE OF ANTIARRHYTHMIC DRUG: ICD-10-CM

## 2023-08-10 DIAGNOSIS — Z79.899 LONG TERM CURRENT USE OF ANTIARRHYTHMIC DRUG: Primary | ICD-10-CM

## 2023-08-10 LAB
ANION GAP SERPL CALCULATED.3IONS-SCNC: 7.6 MMOL/L (ref 5–15)
BUN SERPL-MCNC: 16 MG/DL (ref 8–23)
BUN/CREAT SERPL: 18.4 (ref 7–25)
CALCIUM SPEC-SCNC: 9.2 MG/DL (ref 8.6–10.5)
CHLORIDE SERPL-SCNC: 104 MMOL/L (ref 98–107)
CO2 SERPL-SCNC: 25.4 MMOL/L (ref 22–29)
CREAT SERPL-MCNC: 0.87 MG/DL (ref 0.57–1)
EGFRCR SERPLBLD CKD-EPI 2021: 68.3 ML/MIN/1.73
GLUCOSE SERPL-MCNC: 89 MG/DL (ref 65–99)
MAGNESIUM SERPL-MCNC: 2.4 MG/DL (ref 1.6–2.4)
POTASSIUM SERPL-SCNC: 4.9 MMOL/L (ref 3.5–5.2)
SODIUM SERPL-SCNC: 137 MMOL/L (ref 136–145)

## 2023-08-10 PROCEDURE — 83735 ASSAY OF MAGNESIUM: CPT

## 2023-08-10 PROCEDURE — 80048 BASIC METABOLIC PNL TOTAL CA: CPT

## 2023-08-10 PROCEDURE — 36415 COLL VENOUS BLD VENIPUNCTURE: CPT

## (undated) DEVICE — GW PERIPH GUIDERIGHT STD/J/TP PTFE/PCOAT SS 0.038IN 5X150CM

## (undated) DEVICE — MODEL AT P65, P/N 701554-001KIT CONTENTS: HAND CONTROLLER, 3-WAY HIGH-PRESSURE STOPCOCK WITH ROTATING END AND PREMIUM HIGH-PRESSURE TUBING: Brand: ANGIOTOUCH® KIT

## (undated) DEVICE — PK CATH LAB 60

## (undated) DEVICE — ANGIO-SEAL EVOLUTION VASCULAR CLOSURE DEVICE: Brand: ANGIO-SEAL

## (undated) DEVICE — KT INTRO MINISTICK MAX W/GW NITNL/TUNG ECHO 4F 21G 7CM

## (undated) DEVICE — ELECTRODE,RT,STRESS,FOAM,50PK: Brand: MEDLINE

## (undated) DEVICE — INTRO SHEATH ART/FEM ENGAGE .038 6F12CM

## (undated) DEVICE — SPLNT WRST RAD 3560

## (undated) DEVICE — CATH DIAG EXPO M/ PK 6FR FL4/FR4 PIG 3PK

## (undated) DEVICE — GLIDESHEATH BASIC HYDROPHILIC COATED INTRODUCER SHEATH: Brand: GLIDESHEATH

## (undated) DEVICE — NDL ANGIOGR ADV THN SMOTH SGLWALL 21G 1.5

## (undated) DEVICE — A2000 MULTI-USE SYRINGE KIT, P/N 701277-003KIT CONTENTS: 100ML CONTRAST RESERVOIR AND TUBING WITH CONTRAST SPIKE AND CLAMP: Brand: A2000 MULTI-USE SYRINGE KIT

## (undated) DEVICE — MODEL BT2000 P/N 700287-012KIT CONTENTS: MANIFOLD WITH SALINE AND CONTRAST PORTS, SALINE TUBING WITH SPIKE AND HAND SYRINGE, TRANSDUCER: Brand: BT2000 AUTOMATED MANIFOLD KIT